# Patient Record
Sex: FEMALE | URBAN - METROPOLITAN AREA
[De-identification: names, ages, dates, MRNs, and addresses within clinical notes are randomized per-mention and may not be internally consistent; named-entity substitution may affect disease eponyms.]

---

## 2020-05-21 ENCOUNTER — APPOINTMENT (RX ONLY)
Dept: URBAN - METROPOLITAN AREA CLINIC 26 | Facility: CLINIC | Age: 70
Setting detail: DERMATOLOGY
End: 2020-05-21

## 2020-05-21 DIAGNOSIS — L259 CONTACT DERMATITIS AND OTHER ECZEMA, UNSPECIFIED CAUSE: ICD-10-CM

## 2020-05-21 PROBLEM — L30.9 DERMATITIS, UNSPECIFIED: Status: ACTIVE | Noted: 2020-05-21

## 2020-05-21 PROCEDURE — ? COUNSELING

## 2020-05-21 PROCEDURE — 99202 OFFICE O/P NEW SF 15 MIN: CPT

## 2020-05-21 PROCEDURE — ? DIAGNOSIS COMMENT

## 2020-05-21 PROCEDURE — ? PRESCRIPTION

## 2020-05-21 PROCEDURE — ? TREATMENT REGIMEN

## 2020-05-21 PROCEDURE — ? ADDITIONAL NOTES

## 2020-05-21 RX ORDER — FLUOCINONIDE 0.5 MG/G
OINTMENT TOPICAL BID
Qty: 1 | Refills: 3 | Status: ERX | COMMUNITY
Start: 2020-05-21

## 2020-05-21 RX ADMIN — FLUOCINONIDE: 0.5 OINTMENT TOPICAL at 00:00

## 2020-05-21 ASSESSMENT — LOCATION SIMPLE DESCRIPTION DERM
LOCATION SIMPLE: RIGHT PRETIBIAL REGION
LOCATION SIMPLE: LEFT PRETIBIAL REGION
LOCATION SIMPLE: RIGHT LOWER BACK

## 2020-05-21 ASSESSMENT — LOCATION ZONE DERM
LOCATION ZONE: TRUNK
LOCATION ZONE: LEG

## 2020-05-21 ASSESSMENT — LOCATION DETAILED DESCRIPTION DERM
LOCATION DETAILED: LEFT PROXIMAL PRETIBIAL REGION
LOCATION DETAILED: RIGHT PROXIMAL PRETIBIAL REGION
LOCATION DETAILED: RIGHT SUPERIOR MEDIAL MIDBACK

## 2020-05-21 NOTE — PROCEDURE: TREATMENT REGIMEN
Detail Level: Simple
Initiate Treatment: fluocinonide 0.05 % topical ointment BID Apply to affected areas BID for up to x 2 weeks per month

## 2020-05-21 NOTE — PROCEDURE: MIPS QUALITY
Quality 130: Documentation Of Current Medications In The Medical Record: Current Medications Documented
Quality 47: Advance Care Plan: Advance Care Planning discussed and documented in the medical record; patient did not wish or was not able to name a surrogate decision maker or provide an advance care plan.
Quality 431: Preventive Care And Screening: Unhealthy Alcohol Use - Screening: Patient screened for unhealthy alcohol use using a single question and scores less than 2 times per year
Quality 226: Preventive Care And Screening: Tobacco Use: Screening And Cessation Intervention: Patient screened for tobacco use and is an ex/non-smoker
Detail Level: Generalized

## 2020-09-03 ENCOUNTER — APPOINTMENT (RX ONLY)
Dept: URBAN - METROPOLITAN AREA CLINIC 26 | Facility: CLINIC | Age: 70
Setting detail: DERMATOLOGY
End: 2020-09-03

## 2020-09-03 DIAGNOSIS — L82.1 OTHER SEBORRHEIC KERATOSIS: ICD-10-CM

## 2020-09-03 DIAGNOSIS — L57.8 OTHER SKIN CHANGES DUE TO CHRONIC EXPOSURE TO NONIONIZING RADIATION: ICD-10-CM

## 2020-09-03 DIAGNOSIS — D22 MELANOCYTIC NEVI: ICD-10-CM

## 2020-09-03 PROBLEM — D22.9 MELANOCYTIC NEVI, UNSPECIFIED: Status: ACTIVE | Noted: 2020-09-03

## 2020-09-03 PROCEDURE — ? ADDITIONAL NOTES

## 2020-09-03 PROCEDURE — ? COUNSELING

## 2020-09-03 PROCEDURE — 99213 OFFICE O/P EST LOW 20 MIN: CPT

## 2020-09-03 PROCEDURE — ? FULL BODY SKIN EXAM

## 2020-09-03 PROCEDURE — ? PATIENT SPECIFIC COUNSELING

## 2022-01-05 ENCOUNTER — HOSPITAL ENCOUNTER (OUTPATIENT)
Dept: MRI IMAGING | Facility: HOSPITAL | Age: 72
Discharge: HOME | End: 2022-01-05
Payer: MEDICARE

## 2022-01-05 DIAGNOSIS — M77.41 METATARSALGIA, RIGHT FOOT: ICD-10-CM

## 2022-01-05 PROCEDURE — 73718 MRI LOWER EXTREMITY W/O DYE: CPT

## 2022-01-09 ENCOUNTER — NURSE TRIAGE (OUTPATIENT)
Dept: OTHER | Facility: OTHER | Age: 72
End: 2022-01-09

## 2022-01-09 DIAGNOSIS — U07.1 COVID: Primary | ICD-10-CM

## 2022-01-09 NOTE — TELEPHONE ENCOUNTER
Regarding: Symptomatic COVID headache   ----- Message from Karlos Andrade sent at 1/8/2022  9:42 PM EST -----  "i have symptoms of headache, chills, cough, congestion"

## 2022-01-09 NOTE — TELEPHONE ENCOUNTER
Reason for Disposition   [1] COVID-19 infection suspected by caller or triager AND [2] mild symptoms (cough, fever, or others) AND [3] has not gotten tested yet    Answer Assessment - Initial Assessment Questions  1  COVID-19 DIAGNOSIS: "Who made your COVID-19 diagnosis?" "Was it confirmed by a positive lab test?" If not diagnosed by a HCP, ask "Are there lots of cases (community spread) where you live?" Note: See public health department website, if unsure  N/A  2  COVID-19 EXPOSURE: "Was there any known exposure to COVID before the symptoms began?" [facility] Definition of close contact: within 6 feet (2 meters) for a total of 15 minutes or more over a 24-hour period  Not sure   3  ONSET: "When did the COVID-19 symptoms start?"      Thursday afternoon  4  WORST SYMPTOM: "What is your worst symptom?" (e g , cough, fever, shortness of breath, muscle aches)      cough  5  COUGH: "Do you have a cough?" If Yes, ask: "How bad is the cough?"        yes  6  FEVER: "Do you have a fever?" If Yes, ask: "What is your temperature, how was it measured, and when did it start?"      denies  7  RESPIRATORY STATUS: "Describe your breathing?" (e g , shortness of breath, wheezing, unable to speak)       WNL  8  BETTER-SAME-WORSE: "Are you getting better, staying the same or getting worse compared to yesterday?"  If getting worse, ask, "In what way?"      Same  9  HIGH RISK DISEASE: "Do you have any chronic medical problems?" (e g , asthma, heart or lung disease, weak immune system, obesity, etc )      no  10  VACCINE: "Have you gotten the COVID-19 vaccine?" If Yes ask: "Which one, how many shots, when did you get it?"        Yes J&J  11  PREGNANCY: "Is there any chance you are pregnant?" "When was your last menstrual period?"        N/A  12   OTHER SYMPTOMS: "Do you have any other symptoms?"  (e g , chills, fatigue, headache, loss of smell or taste, muscle pain, sore throat; new loss of smell or taste especially support the diagnosis of COVID-19)        HA, congestion, sore throat , chills    Protocols used: CORONAVIRUS (COVID-19) DIAGNOSED OR SUSPECTED-ADULT-AH

## 2022-01-10 PROCEDURE — U0003 INFECTIOUS AGENT DETECTION BY NUCLEIC ACID (DNA OR RNA); SEVERE ACUTE RESPIRATORY SYNDROME CORONAVIRUS 2 (SARS-COV-2) (CORONAVIRUS DISEASE [COVID-19]), AMPLIFIED PROBE TECHNIQUE, MAKING USE OF HIGH THROUGHPUT TECHNOLOGIES AS DESCRIBED BY CMS-2020-01-R: HCPCS | Performed by: INTERNAL MEDICINE

## 2022-01-10 PROCEDURE — U0005 INFEC AGEN DETEC AMPLI PROBE: HCPCS | Performed by: INTERNAL MEDICINE

## 2022-01-26 NOTE — PROGRESS NOTES
Assessment/Plan:  Problem List Items Addressed This Visit        Musculoskeletal and Integument    Osteoporosis     Management per Endo Dr Tadeo Dunlap in [place], [place]  Sees yearly  Next appt 10/22  On Reclast - last infusion 10/21, next infusion due 10/22  Last Dexa 2020  Relevant Orders    Vitamin D 25 hydroxy       Other    Other hyperlipidemia - Primary     Pending labs  Recommend lifestyle modifications  Relevant Orders    CBC and differential    Comprehensive metabolic panel    Lipid panel    TSH, 3rd generation with Free T4 reflex    LDL cholesterol, direct    Other insomnia     Stable on Trazodone 50mg 1/2 tab QHS PRN -usually uses nightly  Recommend lifestyle modifications  Relevant Medications    traZODone (DESYREL) 50 mg tablet    History of left breast cancer     In remission  Patient no longer following with Hematology-Oncology  Mammogram is up-to-date  Metatarsalgia of right foot     Management per Podiatry  Other Visit Diagnoses     Dermatitis        Relevant Orders    Ambulatory Referral to Dermatology    Continue Fluocinonide 0 05% BID PRN  Fragrance-free topicals advised  Chronic frontal sinusitis        Relevant Medications    doxycycline hyclate (VIBRA-TABS) 100 mg tablet    Advise Garth med sinus rinse kit, Mucinex, Claritin/Zyrtec/Allegra, Flonase  Avoid decongestants if you have high blood pressure  Suspect symptoms may be due AR          Encounter for immunization        Relevant Medications    Zoster Vac Recomb Adjuvanted (Shingrix) 50 MCG/0 5ML SUSR    tetanus-diphtheria-acellular pertussis (ADACEL) 5-2-15 5 LF-mcg/0 5 injection    Other Relevant Orders    PNEUMOCOCCAL CONJUGATE VACCINE 13-VALENT LESS THAN 5Y0  (Prevnar 13) (Completed)    Need for hepatitis C screening test        Relevant Orders    Hepatitis C antibody    IFG (impaired fasting glucose)        Relevant Orders    Hemoglobin A1C           Return in about 6 weeks (around 3/10/2022) for iAWV/ F/U HL, Insomnia, AR?, Labs  Future Appointments   Date Time Provider Carlie Gallegos   3/17/2022 12:20 PM Jackie Lynch DO [facility]        Subjective:     Gladys Leroy is a 80 y o  female who presents today as a new patient for her medical conditions  New Patient    Previous PCP:  Ms Kip Moncada, NP [address] [facility] [address] [place] [address] in [place], [place]  Reason for Transfer:  Patient moved  Last seen by previous PCP:  11/5/21  Last Labs:  10/1/21  Last Physical:  Unsure of last AWV  Medical Records Requested:  Yes - Colonscopy, Dexa  HPI:  Chief Complaint   Patient presents with    New Patient Visit     HAS RASH ON BACK THAT DOES NOT GO AWAY, HAS USED FLUOCINONIDE WITH NO IMPROVEMENT  POST NASAL DRIP THAT DOESNT GO AWAY - HAS HAD FOR A COUPLE MONTHS, HAS TAKEN MUCINEX WITH NO RELIEF DUE TO THAT ITS DIFFICULT TO SWALLOW    Medication Refill     NONE    Labs Only     NO LABS    HM     WILL BRING CRC AT NEXT VISIT, DXA HAD IT DONE      -- Above per clinical staff and reviewed  --      HPI      Today:      Rash - On Back - Symptoms x over 1 year, intermittently  Last seen by Derm 6/21 who Rx - Fluocinonide 0 05% BID PRN which helps the itching, but the rash never resolves  No pain  Seen by 6/21 by Juanita Kahn - unsure of Dx  Not other Rx  Post-nasal drip - Symptoms x couple month  Patient states Beti Fair helpful in the past as she feels she has a bacterial and not a virus  Mucinex unhelpful  Pushing fluids c oil of oregano unhelpful  Right Foot Metatarslagia - Management per [facility] Dr Tonya Jasmine  Next appt 2/22  Hyperlipidemia - No statin previously  Watching diet  No regular exercise  Walks occasionally  Has access to a gym  Left Breast Cancer - Dx 2014  No longer seeing Heme/Onc as care completed     S/p left mastectomy, tram flap procedure, fat transplants c necrosis, implants  No CTX or RTX  Patient on Tamoxifen for 2 months in 2014 as she could not tolerate side effects  Insomnia - On Trazodone 50mg 1/2 tab QHS PRN -usually uses nightly, which is helpful  She awakens once for nocturia then awakens, falls back asleep for minutes to hours   snores  Drinks 12 oz caffeine daily  No higher dose of Trazodone or other sleep meds previously  Last attended marriage counseling - 2012 - helpful  Good social supports  No SI/HI/AH/VH  PHQ-2/9 Depression Screening    Little interest or pleasure in doing things: 0 - not at all  Feeling down, depressed, or hopeless: 0 - not at all  Trouble falling or staying asleep, or sleeping too much: 0 - not at all  Feeling tired or having little energy: 0 - not at all  Poor appetite or overeatin - not at all  Feeling bad about yourself - or that you are a failure or have let yourself or your family down: 0 - not at all  Trouble concentrating on things, such as reading the newspaper or watching television: 0 - not at all  Moving or speaking so slowly that other people could have noticed  Or the opposite - being so fidgety or restless that you have been moving around a lot more than usual: 0 - not at all  Thoughts that you would be better off dead, or of hurting yourself in some way: 0 - not at all  PHQ-2 Score: 0  PHQ-2 Interpretation: Negative depression screen  PHQ-9 Score: 0   PHQ-9 Interpretation: No or Minimal depression        KIMMY-7 Flowsheet Screening      Most Recent Value   Over the last 2 weeks, how often have you been bothered by any of the following problems?     Feeling nervous, anxious, or on edge 1   Not being able to stop or control worrying 0   Worrying too much about different things 0   Trouble relaxing 0   Being so restless that it is hard to sit still 0   Becoming easily annoyed or irritable 0   Feeling afraid as if something awful might happen 0   KIMMY-7 Total Score 1        MDQ:  0, Asynchronous, No Problem    Osteoporosis - Management per Endo Dr Abel Avelar in [place], [place]  Sees yearly  Next appt 10/22  On Reclast - last infusion 10/21, next infusion due 10/22  Last Dexa 2020  Reviewed:  Labs 10/1/21    No Gyn  Last colonoscopy 12/21 - normal per patient  The following portions of the patient's history were reviewed and updated as appropriate: allergies, current medications, past family history, past medical history, past social history, past surgical history and problem list       Review of Systems   Constitutional: Negative for appetite change, chills, diaphoresis, fatigue and fever  HENT: Positive for postnasal drip  Negative for congestion, rhinorrhea and sore throat  Eyes: Negative for pain  Respiratory: Negative for cough, chest tightness and shortness of breath  Cardiovascular: Negative for chest pain  Gastrointestinal: Negative for abdominal pain, blood in stool, diarrhea, nausea and vomiting  Genitourinary: Negative for dysuria  Skin: Positive for rash  Current Outpatient Medications   Medication Sig Dispense Refill    ascorbic acid (VITAMIN C) 1000 MG tablet Take 1,000 mg by mouth daily      Biotin 300 MCG TABS Take 1 tablet by mouth in the morning        Calcium Carb-Cholecalciferol 600-800 MG-UNIT TABS Take 1 tablet by mouth 2 (two) times a day        fluocinonide (LIDEX) 0 05 % cream Apply 1 application topically 2 (two) times a day as needed for rash Rx per Derm      Multiple Vitamin (Tab-A-Selene) TABS Take 1 tablet by mouth daily      Omega-3 Fatty Acids (fish oil) 1,000 mg Take 2,000 mg by mouth daily      traZODone (DESYREL) 50 mg tablet Take 0 5-1 tablets (25-50 mg total) by mouth daily at bedtime as needed for sleep 90 tablet 0    Turmeric 500 MG CAPS Take 1 capsule by mouth in the morning        Zoledronic Acid (RECLAST IV) 1 dose IV yearly         doxycycline hyclate (VIBRA-TABS) 100 mg tablet Take 1 tablet (100 mg total) by mouth 2 (two) times a day for 10 days 20 tablet 0    tetanus-diphtheria-acellular pertussis (ADACEL) 5-2-15 5 LF-mcg/0 5 injection Inject 0 5 mL into a muscle once for 1 dose 0 5 mL 0    Zoster Vac Recomb Adjuvanted (Shingrix) 50 MCG/0 5ML SUSR 0 5mL IM for one dose, followed by 0 5mL IM 2-6 months after first dose 1 each 1     No current facility-administered medications for this visit  Objective:  /64   Pulse 73   Temp 97 5 °F (36 4 °C)   Resp 16   Ht 5' 4 76" (1 645 m)   Wt 66 2 kg (146 lb)   SpO2 99%   BMI 24 47 kg/m²    Wt Readings from Last 3 Encounters:   01/27/22 66 2 kg (146 lb)      BP Readings from Last 3 Encounters:   01/27/22 110/64          Physical Exam  Vitals and nursing note reviewed  Constitutional:       Appearance: Normal appearance  She is well-developed and normal weight  HENT:      Head: Normocephalic and atraumatic  Right Ear: Tympanic membrane, ear canal and external ear normal       Left Ear: Tympanic membrane, ear canal and external ear normal       Nose: Congestion present  Right Sinus: Frontal sinus tenderness present  No maxillary sinus tenderness  Left Sinus: Frontal sinus tenderness present  No maxillary sinus tenderness  Mouth/Throat:      Mouth: Mucous membranes are moist       Pharynx: Uvula midline  Tonsils: No tonsillar exudate  Eyes:      Extraocular Movements: Extraocular movements intact  Conjunctiva/sclera: Conjunctivae normal       Pupils: Pupils are equal, round, and reactive to light  Neck:      Vascular: No carotid bruit  Cardiovascular:      Rate and Rhythm: Normal rate and regular rhythm  Pulses: Normal pulses  Heart sounds: Normal heart sounds  Pulmonary:      Effort: Pulmonary effort is normal       Breath sounds: Normal breath sounds  Abdominal:      General: Bowel sounds are normal  There is no distension  Palpations: Abdomen is soft  There is no mass  Tenderness:  There is no abdominal tenderness  There is no guarding or rebound  Musculoskeletal:         General: No swelling or tenderness  Cervical back: Neck supple  Right lower leg: No edema  Left lower leg: No edema  Lymphadenopathy:      Cervical: No cervical adenopathy  Skin:     Findings: No rash  Comments: Cental back T12-L2 c 6cm x 6cm Lenore tree-shaped blanching, nontender, dry, erythematous patch   Neurological:      General: No focal deficit present  Mental Status: She is alert and oriented to person, place, and time  Psychiatric:         Mood and Affect: Mood normal          Behavior: Behavior normal          Thought Content: Thought content normal          Judgment: Judgment normal          Lab Results:      No results found for: WBC, HGB, HCT, PLT, CHOL, TRIG, HDL, LDLDIRECT, ALT, AST, NA, K, CL, CREATININE, BUN, CO2, TSH, PSA, INR, GLUF, HGBA1C, MICROALBUR  No results found for: URICACID  Invalid input(s): BASENAME Vitamin D    MRI foot/forefoot toes right wo contrast    Result Date: 1/5/2022  Narrative: MRI RIGHT FOOT INDICATION:   M77 [id number]: Metatarsalgia, right foot  COMPARISON: None  TECHNIQUE:  MR sequences were obtained of the right foot including the following:  Localizer, sagittal T1/STIR, coronal T1/T2 fat/ sat/STIR, axial T2 fat sat/STIR/PD  Images were acquired on a 1 5 Faiza unit  Gadolinium was not used FINDINGS: SUBCUTANEOUS TISSUES: Normal BONES:  Normal signal intensity  FIRST MTP JOINT:  Mild cartilage thinning and marginal osteophytes compatible with mild osteoarthrosis  SESAMOID BONES:  Subchondral cystic changes noted at the volar base of the distal 1st metatarsal at the level of the sesamoids compatible with osteoarthrosis  The intersesamoid and sesamoid phalangeal ligaments appear intact  OTHER ARTICULAR SURFACE:  As mentioned above in the 1st MTP joint  PLANTAR FASCIA:  Intact  LISFRANC LIGAMENT:  Intact  FOREFOOT TENDONS: Intact   INTERMETATARSAL REGIONS: No bursitis or Seay's neuroma  MUSCULATURE: There is mild edema in the adductor hallucis muscle compatible with grade 1 strain  Impression: Osteoarthrosis of the 1st MTP joint and 1st metatarsal-sesamoid articulation  Grade 1 strain of the abductor hallucis muscle  Workstation performed: [id number]        POCT Labs        Depression Screening and Follow-up Plan: Patient was screened for depression during today's encounter  They screened negative with a PHQ-2 score of 0

## 2022-01-27 ENCOUNTER — OFFICE VISIT (OUTPATIENT)
Dept: FAMILY MEDICINE CLINIC | Facility: CLINIC | Age: 72
End: 2022-01-27
Payer: MEDICARE

## 2022-01-27 VITALS
RESPIRATION RATE: 16 BRPM | DIASTOLIC BLOOD PRESSURE: 64 MMHG | OXYGEN SATURATION: 99 % | SYSTOLIC BLOOD PRESSURE: 110 MMHG | WEIGHT: 146 LBS | BODY MASS INDEX: 24.32 KG/M2 | HEART RATE: 73 BPM | HEIGHT: 65 IN | TEMPERATURE: 97.5 F

## 2022-01-27 DIAGNOSIS — M81.0 OSTEOPOROSIS, UNSPECIFIED OSTEOPOROSIS TYPE, UNSPECIFIED PATHOLOGICAL FRACTURE PRESENCE: ICD-10-CM

## 2022-01-27 DIAGNOSIS — M77.41 METATARSALGIA OF RIGHT FOOT: ICD-10-CM

## 2022-01-27 DIAGNOSIS — Z85.3 HISTORY OF LEFT BREAST CANCER: ICD-10-CM

## 2022-01-27 DIAGNOSIS — E78.49 OTHER HYPERLIPIDEMIA: Primary | ICD-10-CM

## 2022-01-27 DIAGNOSIS — Z11.59 NEED FOR HEPATITIS C SCREENING TEST: ICD-10-CM

## 2022-01-27 DIAGNOSIS — Z23 ENCOUNTER FOR IMMUNIZATION: ICD-10-CM

## 2022-01-27 DIAGNOSIS — R73.01 IFG (IMPAIRED FASTING GLUCOSE): ICD-10-CM

## 2022-01-27 DIAGNOSIS — L30.9 DERMATITIS: ICD-10-CM

## 2022-01-27 DIAGNOSIS — G47.09 OTHER INSOMNIA: ICD-10-CM

## 2022-01-27 DIAGNOSIS — J32.1 CHRONIC FRONTAL SINUSITIS: ICD-10-CM

## 2022-01-27 PROCEDURE — G0009 ADMIN PNEUMOCOCCAL VACCINE: HCPCS

## 2022-01-27 PROCEDURE — 90670 PCV13 VACCINE IM: CPT

## 2022-01-27 PROCEDURE — 99205 OFFICE O/P NEW HI 60 MIN: CPT | Performed by: FAMILY MEDICINE

## 2022-01-27 RX ORDER — CHLORAL HYDRATE 500 MG
2000 CAPSULE ORAL DAILY
COMMUNITY

## 2022-01-27 RX ORDER — ZOSTER VACCINE RECOMBINANT, ADJUVANTED 50 MCG/0.5
KIT INTRAMUSCULAR
Qty: 1 EACH | Refills: 1 | Status: SHIPPED | OUTPATIENT
Start: 2022-01-27

## 2022-01-27 RX ORDER — LANOLIN ALCOHOL/MO/W.PET/CERES
1 CREAM (GRAM) TOPICAL DAILY
COMMUNITY

## 2022-01-27 RX ORDER — TRAZODONE HYDROCHLORIDE 50 MG/1
25-50 TABLET ORAL
Qty: 90 TABLET | Refills: 0 | Status: SHIPPED | OUTPATIENT
Start: 2022-01-27 | End: 2022-03-17 | Stop reason: SDUPTHER

## 2022-01-27 RX ORDER — DOXYCYCLINE HYCLATE 100 MG
100 TABLET ORAL 2 TIMES DAILY
Qty: 20 TABLET | Refills: 0 | Status: SHIPPED | OUTPATIENT
Start: 2022-01-27 | End: 2022-02-06

## 2022-01-27 RX ORDER — MULTIVITAMIN WITH FOLIC ACID 400 MCG
1 TABLET ORAL DAILY
COMMUNITY

## 2022-01-27 RX ORDER — TRAZODONE HYDROCHLORIDE 50 MG/1
25-50 TABLET ORAL
COMMUNITY
Start: 2021-11-22 | End: 2022-01-27 | Stop reason: SDUPTHER

## 2022-01-27 RX ORDER — PROPRANOLOL/HYDROCHLOROTHIAZID 40 MG-25MG
1 TABLET ORAL DAILY
COMMUNITY

## 2022-01-27 NOTE — ASSESSMENT & PLAN NOTE
Management per Endo Dr Lanie Henriquez in [place], [place]  Sees yearly  Next appt 10/22  On Reclast - last infusion 10/21, next infusion due 10/22  Last Dexa 2020

## 2022-01-27 NOTE — PATIENT INSTRUCTIONS
Advise Verneice Bath med sinus rinse kit, Mucinex, Claritin/Zyrtec/Allegra, Flonase  Avoid decongestants if you have high blood pressure

## 2022-03-10 ENCOUNTER — LAB (OUTPATIENT)
Dept: LAB | Facility: CLINIC | Age: 72
End: 2022-03-10
Payer: MEDICARE

## 2022-03-10 ENCOUNTER — RA CDI HCC (OUTPATIENT)
Dept: OTHER | Facility: HOSPITAL | Age: 72
End: 2022-03-10

## 2022-03-10 DIAGNOSIS — Z11.59 NEED FOR HEPATITIS C SCREENING TEST: ICD-10-CM

## 2022-03-10 DIAGNOSIS — R73.01 IFG (IMPAIRED FASTING GLUCOSE): ICD-10-CM

## 2022-03-10 DIAGNOSIS — M81.0 OSTEOPOROSIS, UNSPECIFIED OSTEOPOROSIS TYPE, UNSPECIFIED PATHOLOGICAL FRACTURE PRESENCE: ICD-10-CM

## 2022-03-10 DIAGNOSIS — E78.49 OTHER HYPERLIPIDEMIA: ICD-10-CM

## 2022-03-10 LAB
25(OH)D3 SERPL-MCNC: 37.4 NG/ML (ref 30–100)
ALBUMIN SERPL BCP-MCNC: 4.2 G/DL (ref 3.5–5)
ALP SERPL-CCNC: 49 U/L (ref 46–116)
ALT SERPL W P-5'-P-CCNC: 26 U/L (ref 12–78)
ANION GAP SERPL CALCULATED.3IONS-SCNC: 8 MMOL/L (ref 4–13)
AST SERPL W P-5'-P-CCNC: 18 U/L (ref 5–45)
BASOPHILS # BLD AUTO: 0.06 THOUSANDS/ΜL (ref 0–0.1)
BASOPHILS NFR BLD AUTO: 1 % (ref 0–1)
BILIRUB SERPL-MCNC: 0.46 MG/DL (ref 0.2–1)
BUN SERPL-MCNC: 17 MG/DL (ref 5–25)
CALCIUM SERPL-MCNC: 9.1 MG/DL (ref 8.3–10.1)
CHLORIDE SERPL-SCNC: 104 MMOL/L (ref 100–108)
CHOLEST SERPL-MCNC: 248 MG/DL
CO2 SERPL-SCNC: 27 MMOL/L (ref 21–32)
CREAT SERPL-MCNC: 0.81 MG/DL (ref 0.6–1.3)
EOSINOPHIL # BLD AUTO: 0.42 THOUSAND/ΜL (ref 0–0.61)
EOSINOPHIL NFR BLD AUTO: 5 % (ref 0–6)
ERYTHROCYTE [DISTWIDTH] IN BLOOD BY AUTOMATED COUNT: 13.2 % (ref 11.6–15.1)
EST. AVERAGE GLUCOSE BLD GHB EST-MCNC: 117 MG/DL
GFR SERPL CREATININE-BSD FRML MDRD: 73 ML/MIN/1.73SQ M
GLUCOSE P FAST SERPL-MCNC: 106 MG/DL (ref 65–99)
HBA1C MFR BLD: 5.7 %
HCT VFR BLD AUTO: 41.5 % (ref 34.8–46.1)
HCV AB SER QL: NORMAL
HDLC SERPL-MCNC: 83 MG/DL
HGB BLD-MCNC: 14.1 G/DL (ref 11.5–15.4)
IMM GRANULOCYTES # BLD AUTO: 0.02 THOUSAND/UL (ref 0–0.2)
IMM GRANULOCYTES NFR BLD AUTO: 0 % (ref 0–2)
LDLC SERPL CALC-MCNC: 142 MG/DL (ref 0–100)
LDLC SERPL DIRECT ASSAY-MCNC: 153 MG/DL (ref 0–100)
LYMPHOCYTES # BLD AUTO: 3.25 THOUSANDS/ΜL (ref 0.6–4.47)
LYMPHOCYTES NFR BLD AUTO: 41 % (ref 14–44)
MCH RBC QN AUTO: 30.2 PG (ref 26.8–34.3)
MCHC RBC AUTO-ENTMCNC: 34 G/DL (ref 31.4–37.4)
MCV RBC AUTO: 89 FL (ref 82–98)
MONOCYTES # BLD AUTO: 0.48 THOUSAND/ΜL (ref 0.17–1.22)
MONOCYTES NFR BLD AUTO: 6 % (ref 4–12)
NEUTROPHILS # BLD AUTO: 3.66 THOUSANDS/ΜL (ref 1.85–7.62)
NEUTS SEG NFR BLD AUTO: 47 % (ref 43–75)
NONHDLC SERPL-MCNC: 165 MG/DL
NRBC BLD AUTO-RTO: 0 /100 WBCS
PLATELET # BLD AUTO: 258 THOUSANDS/UL (ref 149–390)
PMV BLD AUTO: 9.7 FL (ref 8.9–12.7)
POTASSIUM SERPL-SCNC: 3.6 MMOL/L (ref 3.5–5.3)
PROT SERPL-MCNC: 7.9 G/DL (ref 6.4–8.2)
RBC # BLD AUTO: 4.67 MILLION/UL (ref 3.81–5.12)
SODIUM SERPL-SCNC: 139 MMOL/L (ref 136–145)
T4 FREE SERPL-MCNC: 1.07 NG/DL (ref 0.76–1.46)
TRIGL SERPL-MCNC: 116 MG/DL
TSH SERPL DL<=0.05 MIU/L-ACNC: 6.16 UIU/ML (ref 0.36–3.74)
WBC # BLD AUTO: 7.89 THOUSAND/UL (ref 4.31–10.16)

## 2022-03-10 PROCEDURE — 84443 ASSAY THYROID STIM HORMONE: CPT

## 2022-03-10 PROCEDURE — 82306 VITAMIN D 25 HYDROXY: CPT

## 2022-03-10 PROCEDURE — 85025 COMPLETE CBC W/AUTO DIFF WBC: CPT

## 2022-03-10 PROCEDURE — 80061 LIPID PANEL: CPT

## 2022-03-10 PROCEDURE — 36415 COLL VENOUS BLD VENIPUNCTURE: CPT

## 2022-03-10 PROCEDURE — 84439 ASSAY OF FREE THYROXINE: CPT

## 2022-03-10 PROCEDURE — 86803 HEPATITIS C AB TEST: CPT

## 2022-03-10 PROCEDURE — 83036 HEMOGLOBIN GLYCOSYLATED A1C: CPT

## 2022-03-10 PROCEDURE — 83721 ASSAY OF BLOOD LIPOPROTEIN: CPT

## 2022-03-10 PROCEDURE — 80053 COMPREHEN METABOLIC PANEL: CPT

## 2022-03-10 NOTE — PROGRESS NOTES
[facility] 75  coding opportunities       Chart reviewed, no opportunity found: CHART REVIEWED, NO OPPORTUNITY FOUND                        Patients insurance company: [employer] (Medicare Advantage and Commercial)

## 2022-03-10 NOTE — RESULT ENCOUNTER NOTE
Unstable labs - will review with patient at upcoming appointment  Hyperlipidemia - Recommend lifestyle modifications  The 10-year ASCVD risk score (Fadia Calix et al , 2013) is: 7%    Values used to calculate the score:      Age: 80 years      Sex: Female      Is Non- : No      Diabetic: No      Tobacco smoker: No      Systolic Blood Pressure: 216 mmHg      Is BP treated: No      HDL Cholesterol: 83 mg/dL      Total Cholesterol: 248 mg/dL    HypOthyroidism - Subclinical?  Repeat thyroid labs and Abs in 6 weeks

## 2022-03-17 ENCOUNTER — OFFICE VISIT (OUTPATIENT)
Dept: FAMILY MEDICINE CLINIC | Facility: CLINIC | Age: 72
End: 2022-03-17
Payer: MEDICARE

## 2022-03-17 VITALS
SYSTOLIC BLOOD PRESSURE: 118 MMHG | RESPIRATION RATE: 16 BRPM | HEIGHT: 65 IN | TEMPERATURE: 97.7 F | HEART RATE: 83 BPM | BODY MASS INDEX: 24.29 KG/M2 | OXYGEN SATURATION: 99 % | WEIGHT: 145.8 LBS | DIASTOLIC BLOOD PRESSURE: 66 MMHG

## 2022-03-17 DIAGNOSIS — E78.49 OTHER HYPERLIPIDEMIA: ICD-10-CM

## 2022-03-17 DIAGNOSIS — Z23 ENCOUNTER FOR IMMUNIZATION: ICD-10-CM

## 2022-03-17 DIAGNOSIS — E03.8 OTHER SPECIFIED HYPOTHYROIDISM: ICD-10-CM

## 2022-03-17 DIAGNOSIS — Z00.00 MEDICARE ANNUAL WELLNESS VISIT, INITIAL: Primary | ICD-10-CM

## 2022-03-17 DIAGNOSIS — R73.01 IFG (IMPAIRED FASTING GLUCOSE): ICD-10-CM

## 2022-03-17 DIAGNOSIS — Z12.31 ENCOUNTER FOR SCREENING MAMMOGRAM FOR BREAST CANCER: ICD-10-CM

## 2022-03-17 DIAGNOSIS — F43.23 ADJUSTMENT DISORDER WITH MIXED ANXIETY AND DEPRESSED MOOD: ICD-10-CM

## 2022-03-17 DIAGNOSIS — M77.41 METATARSALGIA OF RIGHT FOOT: ICD-10-CM

## 2022-03-17 DIAGNOSIS — R13.10 DYSPHAGIA, UNSPECIFIED TYPE: ICD-10-CM

## 2022-03-17 DIAGNOSIS — G47.09 OTHER INSOMNIA: ICD-10-CM

## 2022-03-17 DIAGNOSIS — M81.0 OSTEOPOROSIS, UNSPECIFIED OSTEOPOROSIS TYPE, UNSPECIFIED PATHOLOGICAL FRACTURE PRESENCE: ICD-10-CM

## 2022-03-17 DIAGNOSIS — Z85.3 HISTORY OF LEFT BREAST CANCER: ICD-10-CM

## 2022-03-17 DIAGNOSIS — Z23 NEED FOR VACCINATION: ICD-10-CM

## 2022-03-17 PROCEDURE — 99214 OFFICE O/P EST MOD 30 MIN: CPT | Performed by: FAMILY MEDICINE

## 2022-03-17 PROCEDURE — 1123F ACP DISCUSS/DSCN MKR DOCD: CPT | Performed by: FAMILY MEDICINE

## 2022-03-17 PROCEDURE — G0438 PPPS, INITIAL VISIT: HCPCS | Performed by: FAMILY MEDICINE

## 2022-03-17 RX ORDER — PANTOPRAZOLE SODIUM 20 MG/1
20 TABLET, DELAYED RELEASE ORAL
Qty: 30 TABLET | Refills: 1 | Status: SHIPPED | OUTPATIENT
Start: 2022-03-17 | End: 2022-07-19

## 2022-03-17 RX ORDER — TRAZODONE HYDROCHLORIDE 50 MG/1
25-50 TABLET ORAL
Qty: 90 TABLET | Refills: 2 | Status: SHIPPED | OUTPATIENT
Start: 2022-03-17

## 2022-03-17 RX ORDER — DIMENHYDRINATE 50 MG
100 TABLET ORAL DAILY
COMMUNITY

## 2022-03-17 RX ORDER — LEVOTHYROXINE SODIUM 0.03 MG/1
25 TABLET ORAL
Qty: 30 TABLET | Refills: 1 | Status: SHIPPED | OUTPATIENT
Start: 2022-03-17 | End: 2022-04-28

## 2022-03-17 NOTE — PROGRESS NOTES
Assessment and Plan:     Problem List Items Addressed This Visit        Endocrine    IFG (impaired fasting glucose)     New  Patient is considering starting metformin XR 1500 mg daily  She will call for Rx and Rx for CMP 1 month after starting Rx if interested  Advise lifestyle modifications  Relevant Orders    Ambulatory Referral to Diabetic Education    Comprehensive metabolic panel    Hemoglobin A1C    Other specified hypothyroidism     New  Start Synthroid 25 mcg daily  Pending labs  Relevant Medications    levothyroxine (Synthroid) 25 mcg tablet    Other Relevant Orders    TSH, 3rd generation with Free T4 reflex    Thyroid Antibodies Panel    Thyroglobulin Panel    Anti-microsomal antibody       Musculoskeletal and Integument    Osteoporosis     Management per Endo Dr Urbano Situ in [place], [place]  Sees yearly  Next appt 10/22  On Reclast - last infusion 10/21, next infusion due 10/22  Last Dexa 2020  Relevant Orders    DXA bone density spine hip and pelvis       Other    Other hyperlipidemia     Unstable  Patient declines starting statin today despite increased ASCVD risk  Would Rx Lipitor 20mg QHS  Recommend lifestyle modifications  The 10-year ASCVD risk score (Shakila Painter et al , 2013) is: 8%    Values used to calculate the score:      Age: 80 years      Sex: Female      Is Non- : No      Diabetic: No      Tobacco smoker: No      Systolic Blood Pressure: 926 mmHg      Is BP treated: No      HDL Cholesterol: 83 mg/dL      Total Cholesterol: 248 mg/dL           Relevant Orders    Ambulatory Referral to Diabetic Education    Comprehensive metabolic panel    TSH, 3rd generation with Free T4 reflex    LDL cholesterol, direct    Other insomnia     Stable on Trazodone 50mg 1/2 tab QHS PRN - usually uses nightly  Recommend lifestyle modifications           Relevant Medications    traZODone (DESYREL) 50 mg tablet    History of left breast cancer     In remission  Patient no longer following with Hematology-Oncology  Mammogram is up-to-date  Relevant Orders    Mammo screening bilateral w 3d & cad    Metatarsalgia of right foot     Management per Podiatry  F/U PRN  Other Visit Diagnoses     Medicare annual wellness visit, initial    -  Primary    Adjustment disorder with mixed anxiety and depressed mood        Relevant Medications    traZODone (DESYREL) 50 mg tablet    Encounter for screening mammogram for breast cancer        Relevant Orders    Mammo screening bilateral w 3d & cad    Encounter for immunization        Relevant Medications    tetanus-diphtheria-acellular pertussis (ADACEL) 5-2-15 5 LF-mcg/0 5 injection    Dysphagia, unspecified type        Relevant Medications    pantoprazole (PROTONIX) 20 mg tablet    Other Relevant Orders    Ambulatory Referral to Otolaryngology    Magnesium    Need for vaccination        Relevant Medications    tetanus-diphtheria-acellular pertussis (ADACEL) 5-2-15 5 LF-mcg/0 5 injection           Preventive health issues were discussed with patient, and age appropriate screening tests were ordered as noted in patient's After Visit Summary  Personalized health advice and appropriate referrals for health education or preventive services given if needed, as noted in patient's After Visit Summary       History of Present Illness:     Patient presents for Medicare Annual Wellness visit    Patient Care Team:  Ariadne Palacios DO as PCP - General (Family Medicine)     Problem List:     Patient Active Problem List   Diagnosis    Other hyperlipidemia    History of left breast cancer    Other insomnia    Osteoporosis    Metatarsalgia of right foot    IFG (impaired fasting glucose)    Other specified hypothyroidism      Past Medical and Surgical History:     Past Medical History:   Diagnosis Date    COVID-19 01/10/2022    History of left breast cancer 05/2014    S/p left mastectomy, trans flap procedure, fat transplants c necrosis, implants  No CTX or RTX   IFG (impaired fasting glucose)     Osteoporosis     Other hyperlipidemia     Other insomnia     Other specified hypothyroidism 3/17/2022     Past Surgical History:   Procedure Laterality Date    APPENDECTOMY  10/1959    BREAST IMPLANT Bilateral 2016    KNEE SURGERY Right 03/2018    Tibial Plateau Fracture - Plate and 5 Screws in place    MASTECTOMY Left 06/2014    Left Breast Cancer    RECONSTRUCTION BREAST W/ TRAM FLAP Left 06/2014    With fat graft x 4 due to necrosis    SHOULDER SURGERY Right 11/2020    [employer] - Calcification Removal - [address]  03/1978      Family History:     Family History   Problem Relation Age of Onset    Dementia Mother [de-identified]        Alzheimer    Alzheimer's disease Mother [de-identified]    Brain cancer Father 61        Type Unknown    Dementia Sister 79        Alzheimers    Alzheimer's disease Sister 79    Asthma Sister     No Known Problems Brother     No Known Problems Daughter     No Known Problems Daughter     Asthma Maternal Grandmother     Stroke Maternal Grandfather     Hearing loss Maternal Grandfather     Diabetes Paternal Grandmother     Heart attack Paternal Grandfather       Social History:     Social History     Socioeconomic History    Marital status: /Civil Union     Spouse name: None    Number of children: 2    Years of education: None    Highest education level: None   Occupational History    Occupation: Retired -    Tobacco Use    Smoking status: Never Smoker    Smokeless tobacco: Never Used   Vaping Use    Vaping Use: Never used   Substance and Sexual Activity    Alcohol use:  Yes     Alcohol/week: 5 0 standard drinks     Types: 5 Glasses of wine per week    Drug use: Never    Sexual activity: Yes     Partners: Male     Birth control/protection: Post-menopausal, Female Sterilization     Comment: Tubal Ligation   Other Topics Concern    None   Social History Narrative        Lives with     2 Children - 2 Daughters    Retired -      Social Determinants of Health     Financial Resource Strain: Not on [employer] Insecurity: Not on file   Transportation Needs: Not on file   Physical Activity: Not on file   Stress: Not on file   Social Connections: Not on file   Intimate Partner Violence: Not on file   Housing Stability: Not on file      Medications and Allergies:     Current Outpatient Medications   Medication Sig Dispense Refill    ascorbic acid (VITAMIN C) 1000 MG tablet Take 1,000 mg by mouth daily      Biotin 300 MCG TABS Take 1 tablet by mouth in the morning        Calcium Carb-Cholecalciferol 600-800 MG-UNIT TABS Take 1 tablet by mouth 2 (two) times a day        coenzyme Q-10 100 MG capsule Take 100 mg by mouth daily      Multiple Vitamin (Tab-A-Selene) TABS Take 1 tablet by mouth daily      Omega-3 Fatty Acids (fish oil) 1,000 mg Take 2,000 mg by mouth daily      traZODone (DESYREL) 50 mg tablet Take 0 5-1 tablets (25-50 mg total) by mouth daily at bedtime as needed for sleep 90 tablet 2    Turmeric 500 MG CAPS Take 1 capsule by mouth in the morning        Zoledronic Acid (RECLAST IV) 1 dose IV yearly   fluocinonide (LIDEX) 0 05 % cream Apply 1 application topically 2 (two) times a day as needed for rash Rx per Derm (Patient not taking: Reported on 3/17/2022 )      levothyroxine (Synthroid) 25 mcg tablet Take 1 tablet (25 mcg total) by mouth daily in the early morning 1 pill by mouth once daily on an empty stomach, 30 minutes prior to eating food or drink   30 tablet 1    pantoprazole (PROTONIX) 20 mg tablet Take 1 tablet (20 mg total) by mouth daily before breakfast 30 tablet 1    tetanus-diphtheria-acellular pertussis (ADACEL) 5-2-15 5 LF-mcg/0 5 injection Inject 0 5 mL into a muscle once for 1 dose 0 5 mL 0    Zoster Vac Recomb Adjuvanted (Shingrix) 50 MCG/0 5ML SUSR 0 5mL IM for one dose, followed by 0 5mL IM 2-6 months after first dose (Patient not taking: Reported on 3/17/2022 ) 1 each 1     No current facility-administered medications for this visit  Allergies   Allergen Reactions    Prochlorperazine Swelling, Seizures and Tongue Swelling     As a child    Penicillins Rash      Immunizations:     Immunization History   Administered Date(s) Administered    COVID-19 J&J ([place]) vaccine 0 5 mL 05/19/2021    INFLUENZA 09/03/2020    Influenza Injectable, MDCK, Preservative Free, Quadrivalent 10/03/2018    Influenza Injectable, MDCK, Preservative Free, Quadrivalent, 0 5 mL 03/05/2020    Influenza Quadrivalent Preservative Free 3 years and older IM 10/11/2017    Influenza, seasonal, injectable 01/01/2017, 09/21/2020, 10/13/2020, 11/17/2020, 12/29/2020    Pneumococcal Conjugate 13-Valent 01/27/2022    Pneumococcal Polysaccharide PPV23 10/03/2018, 10/13/2020    Tdap 01/01/2011    Zoster 01/01/2015      Health Maintenance:         Topic Date Due    Colorectal Cancer Screening  Never done    Breast Cancer Screening: Mammogram  08/24/2022    Hepatitis C Screening  Completed         Topic Date Due    DTaP,Tdap,and Td Vaccines (2 - Td or Tdap) 01/01/2021      Medicare Health Risk Assessment:     /66   Pulse 83   Temp 97 7 °F (36 5 °C)   Resp 16   Ht 5' 5 08" (1 653 m)   Wt 66 1 kg (145 lb 12 8 oz)   SpO2 99%   BMI 24 20 kg/m²      Ramón Schilling is here for her Initial Wellness visit  Health Risk Assessment:   Patient rates overall health as very good  Patient feels that their physical health rating is slightly worse  Patient is very satisfied with their life  Eyesight was rated as slightly worse  Hearing was rated as same  Patient feels that their emotional and mental health rating is same  Patients states they are sometimes angry  Patient states they are sometimes unusually tired/fatigued  Pain experienced in the last 7 days has been none   Patient states that she has experienced no weight loss or gain in last 6 months  Depression Screening:   PHQ-2 Score: 0      Fall Risk Screening: In the past year, patient has experienced: no history of falling in past year      Urinary Incontinence Screening:   Patient has not leaked urine accidently in the last six months  Home Safety:  Patient does not have trouble with stairs inside or outside of their home  Patient has working smoke alarms and has working carbon monoxide detector  Home safety hazards include: none  Nutrition:   Current diet is Regular, Low Carb and Limited junk food  Medications:   Patient is currently taking over-the-counter supplements  OTC medications include: see chart  Patient is able to manage medications  Activities of Daily Living (ADLs)/Instrumental Activities of Daily Living (IADLs):   Walk and transfer into and out of bed and chair?: Yes  Dress and groom yourself?: Yes    Bathe or shower yourself?: Yes    Feed yourself? Yes  Do your laundry/housekeeping?: Yes  Manage your money, pay your bills and track your expenses?: Yes  Make your own meals?: Yes    Do your own shopping?: Yes    Previous Hospitalizations:   Any hospitalizations or ED visits within the last 12 months?: No      Advance Care Planning:   Living will: No    Durable POA for healthcare: No    Advanced directive: No    Advanced directive counseling given: Yes    Five wishes given: Yes    Patient declined ACP directive: No      Comments: Living Will, POLST, and Five Wishes given today        Cognitive Screening:   Provider or family/friend/caregiver concerned regarding cognition?: No    PREVENTIVE SCREENINGS      Cardiovascular Screening:    General: Screening Not Indicated and History Lipid Disorder      Diabetes Screening:     General: Screening Current      Colorectal Cancer Screening:     General: Screening Current      Breast Cancer Screening:     General: Screening Current      Cervical Cancer Screening:    General: Screening Not Indicated      Osteoporosis Screening:    General: Screening Not Indicated and History Osteoporosis      Abdominal Aortic Aneurysm (AAA) Screening:        General: Screening Not Indicated      Lung Cancer Screening:     General: Screening Not Indicated      Hepatitis C Screening:    General: Screening Current    Screening, Brief Intervention, and Referral to Treatment (SBIRT)    Screening  Typical number of drinks in a day: 0  Typical number of drinks in a week: 5  Interpretation: Low risk drinking behavior  AUDIT-C Screenin) How often did you have a drink containing alcohol in the past year? 4 or more times a week  2) How many drinks did you have on a typical day when you were drinking in the past year? 1 to 2  3) How often did you have 6 or more drinks on one occasion in the past year? never    AUDIT-C Score: 4  Interpretation: Score 3-12 (female): POSITIVE screen for alcohol misuse    AUDIT Screenin) How often during the last year have you found that you were not able to stop drinking once you had started? 0 - never  5) How often during the last year have you failed to do what was normally expected from you because of drinking? 0 - never  6) How often during the last year have you needed a first drink in the morning to get yourself going after a heavy drinking session?  0 - never  7) How often during the last year have you had a feeling of guilt or remorse after drinking? 0 - never  8) How often during the last year have you been unable to remember what happened the night before because you had been drinking? 0 - never  9) Have you or someone else been injured as a result of your drinking? 0 - no  10) Has a relative or friend or a doctor or another health worker been concerned about your drinking or suggested you cut down? 0 - no    AUDIT Score: 4  Interpretation: Low risk alcohol consumption    Single Item Drug Screening:  How often have you used an illegal drug (including marijuana) or a prescription medication for non-medical reasons in the past year? never    Single Item Drug Screen Score: 0  Interpretation: Negative screen for possible drug use disorder    Other Counseling Topics:   Calcium and vitamin D intake and regular weightbearing exercise         Neetu Dey, DO

## 2022-03-17 NOTE — PATIENT INSTRUCTIONS
Medicare Preventive Visit Patient Instructions  Thank you for completing your Welcome to Medicare Visit or Medicare Annual Wellness Visit today  Your next wellness visit will be due in one year (3/18/2023)  The screening/preventive services that you may require over the next 5-10 years are detailed below  Some tests may not apply to you based off risk factors and/or age  Screening tests ordered at today's visit but not completed yet may show as past due  Also, please note that scanned in results may not display below  Preventive Screenings:  Service Recommendations Previous Testing/Comments   Colorectal Cancer Screening  * Colonoscopy    * Fecal Occult Blood Test (FOBT)/Fecal Immunochemical Test (FIT)  * Fecal DNA/Cologuard Test  * Flexible Sigmoidoscopy Age: 54-65 years old   Colonoscopy: every 10 years (may be performed more frequently if at higher risk)  OR  FOBT/FIT: every 1 year  OR  Cologuard: every 3 years  OR  Sigmoidoscopy: every 5 years  Screening may be recommended earlier than age 48 if at higher risk for colorectal cancer  Also, an individualized decision between you and your healthcare provider will decide whether screening between the ages of 74-80 would be appropriate  Colonoscopy: Not on file  FOBT/FIT: Not on file  Cologuard: Not on file  Sigmoidoscopy: Not on file          Breast Cancer Screening Age: 36 years old  Frequency: every 1-2 years  Not required if history of left and right mastectomy Mammogram: 08/24/2021    Screening Current   Cervical Cancer Screening Between the ages of 21-29, pap smear recommended once every 3 years  Between the ages of 33-67, can perform pap smear with HPV co-testing every 5 years     Recommendations may differ for women with a history of total hysterectomy, cervical cancer, or abnormal pap smears in past  Pap Smear: Not on file    Screening Not Indicated   Hepatitis C Screening Once for adults born between 1945 and 1965  More frequently in patients at high risk for Hepatitis C Hep C Antibody: 03/10/2022    Screening Current   Diabetes Screening 1-2 times per year if you're at risk for diabetes or have pre-diabetes Fasting glucose: 106 mg/dL   A1C: 5 7 %    Screening Current   Cholesterol Screening Once every 5 years if you don't have a lipid disorder  May order more often based on risk factors  Lipid panel: 03/10/2022    Screening Not Indicated  History Lipid Disorder     Other Preventive Screenings Covered by Medicare:  1  Abdominal Aortic Aneurysm (AAA) Screening: covered once if your at risk  You're considered to be at risk if you have a family history of AAA  2  Lung Cancer Screening: covers low dose CT scan once per year if you meet all of the following conditions: (1) Age 50-69; (2) No signs or symptoms of lung cancer; (3) Current smoker or have quit smoking within the last 15 years; (4) You have a tobacco smoking history of at least 30 pack years (packs per day multiplied by number of years you smoked); (5) You get a written order from a healthcare provider  3  Glaucoma Screening: covered annually if you're considered high risk: (1) You have diabetes OR (2) Family history of glaucoma OR (3)  aged 48 and older OR (3)  American aged 72 and older  3  Osteoporosis Screening: covered every 2 years if you meet one of the following conditions: (1) You're estrogen deficient and at risk for osteoporosis based off medical history and other findings; (2) Have a vertebral abnormality; (3) On glucocorticoid therapy for more than 3 months; (4) Have primary hyperparathyroidism; (5) On osteoporosis medications and need to assess response to drug therapy  · Last bone density test (DXA Scan): Not on file  5  HIV Screening: covered annually if you're between the age of 12-76  Also covered annually if you are younger than 13 and older than 72 with risk factors for HIV infection   For pregnant patients, it is covered up to 3 times per pregnancy  Immunizations:  Immunization Recommendations   Influenza Vaccine Annual influenza vaccination during flu season is recommended for all persons aged >= 6 months who do not have contraindications   Pneumococcal Vaccine (Prevnar and Pneumovax)  * Prevnar = PCV13  * Pneumovax = PPSV23   Adults 25-60 years old: 1-3 doses may be recommended based on certain risk factors  Adults 72 years old: Prevnar (PCV13) vaccine recommended followed by Pneumovax (PPSV23) vaccine  If already received PPSV23 since turning 65, then PCV13 recommended at least one year after PPSV23 dose  Hepatitis B Vaccine 3 dose series if at intermediate or high risk (ex: diabetes, end stage renal disease, liver disease)   Tetanus (Td) Vaccine - COST NOT COVERED BY MEDICARE PART B Following completion of primary series, a booster dose should be given every 10 years to maintain immunity against tetanus  Td may also be given as tetanus wound prophylaxis  Tdap Vaccine - COST NOT COVERED BY MEDICARE PART B Recommended at least once for all adults  For pregnant patients, recommended with each pregnancy  Shingles Vaccine (Shingrix) - COST NOT COVERED BY MEDICARE PART B  2 shot series recommended in those aged 48 and above     Health Maintenance Due:      Topic Date Due    Colorectal Cancer Screening  Never done    Breast Cancer Screening: Mammogram  08/24/2022    Hepatitis C Screening  Completed     Immunizations Due:      Topic Date Due    DTaP,Tdap,and Td Vaccines (2 - Td or Tdap) 01/01/2021     Advance Directives   What are advance directives? Advance directives are legal documents that state your wishes and plans for medical care  These plans are made ahead of time in case you lose your ability to make decisions for yourself  Advance directives can apply to any medical decision, such as the treatments you want, and if you want to donate organs  What are the types of advance directives?   There are many types of advance directives, and each state has rules about how to use them  You may choose a combination of any of the following:  · Living will: This is a written record of the treatment you want  You can also choose which treatments you do not want, which to limit, and which to stop at a certain time  This includes surgery, medicine, IV fluid, and tube feedings  · Durable power of  for healthcare [facility]): This is a written record that states who you want to make healthcare choices for you when you are unable to make them for yourself  This person, called a proxy, is usually a family member or a friend  You may choose more than 1 proxy  · Do not resuscitate (DNR) order:  A DNR order is used in case your heart stops beating or you stop breathing  It is a request not to have certain forms of treatment, such as CPR  A DNR order may be included in other types of advance directives  · Medical directive: This covers the care that you want if you are in a coma, near death, or unable to make decisions for yourself  You can list the treatments you want for each condition  Treatment may include pain medicine, surgery, blood transfusions, dialysis, IV or tube feedings, and a ventilator (breathing machine)  · Values history: This document has questions about your views, beliefs, and how you feel and think about life  This information can help others choose the care that you would choose  Why are advance directives important? An advance directive helps you control your care  Although spoken wishes may be used, it is better to have your wishes written down  Spoken wishes can be misunderstood, or not followed  Treatments may be given even if you do not want them  An advance directive may make it easier for your family to make difficult choices about your care  Alcohol Use and Your Health    Drinking too much can harm your health    Excessive alcohol use leads to about 88,000 death in the [place] each year, and shortens the life of those who diet by almost 30 years  Further, excessive drinking cost the economy $249 billion in 2010  Most excessive drinkers are not alcohol dependent  Excessive alcohol use has immediate effects that increase the risk of many harmful health conditions  These are most often the result of binge drinking  Over time, excessive alcohol use can lead to the development of chronic diseases and other series health problems  What is considered a "drink"? Excessive alcohol use includes:  · Binge Drinking: For women, 4 or more drinks consumed on one occasion  For men, 5 or more drinks consumed on one occasion  · Heavy Drinking: For women, 8 or more drinks per week  For men, 15 or more drinks per week  · Any alcohol used by pregnant women  · Any alcohol used by those under the age of 21 years    If you choose to drink, do so in moderation:  · Do not drink at all if you are under the age of 24, or if you are or may be pregnant, or have health problems that could be made worse by drinking    · For women, up to 1 drink per day  · For men, up to 2 drinks a day    No one should begin drinking or drink more frequently based on potential health benefits    Short-Term Health Risks:  · Injuries: motor vehicle crashes, falls, drownings, burns  · Violence: homicide, suicide, sexual assault, intimate partner violence  · Alcohol poisoning  · Reproductive health: risky sexual behaviors, unintended prengnacy, sexually transmitted diseases, miscarriage, stillbirth, fetal alcohol syndrome    Long-Term Health Risks:  · Chronic diseases: high blood pressure, heart disease, stroke, liver disease, digestive problems  · Cancers: breast, mouth and throat, liver, colon  · Learning and memory problems: dementia, poor school performance  · Mental health: depression, anxiety, insomnia  · Social problems: lost productivity, family problems, unemployment  · Alcohol dependence    For support and more information:  · Substance Abuse and [facility] [address] [place] [id number]  Web Address: https://Money-Wizards/    · Alcoholics Anonymous        Web Address: http://www [employer]/    https://www cdc gov/alcohol/fact-sheets/alcohol-use htm     © [address] 2018 Information is for End User's use only and may not be sold, redistributed or otherwise used for commercial purposes  All illustrations and images included in CareNotes® are the copyrighted property of [employer]  or [facility] Preventive Visit Patient Instructions  Thank you for completing your Welcome to Medicare Visit or Medicare Annual Wellness Visit today  Your next wellness visit will be due in one year (3/18/2023)  The screening/preventive services that you may require over the next 5-10 years are detailed below  Some tests may not apply to you based off risk factors and/or age  Screening tests ordered at today's visit but not completed yet may show as past due  Also, please note that scanned in results may not display below  Preventive Screenings:  Service Recommendations Previous Testing/Comments   Colorectal Cancer Screening  * Colonoscopy    * Fecal Occult Blood Test (FOBT)/Fecal Immunochemical Test (FIT)  * Fecal DNA/Cologuard Test  * Flexible Sigmoidoscopy Age: 54-65 years old   Colonoscopy: every 10 years (may be performed more frequently if at higher risk)  OR  FOBT/FIT: every 1 year  OR  Cologuard: every 3 years  OR  Sigmoidoscopy: every 5 years  Screening may be recommended earlier than age 48 if at higher risk for colorectal cancer  Also, an individualized decision between you and your healthcare provider will decide whether screening between the ages of 74-80 would be appropriate   Colonoscopy: Not on file  FOBT/FIT: Not on file  Cologuard: Not on file  Sigmoidoscopy: Not on file          Breast Cancer Screening Age: 36 years old  Frequency: every 1-2 years  Not required if history of left and right mastectomy Mammogram: 08/24/2021    Screening Current   Cervical Cancer Screening Between the ages of 21-29, pap smear recommended once every 3 years  Between the ages of 33-67, can perform pap smear with HPV co-testing every 5 years  Recommendations may differ for women with a history of total hysterectomy, cervical cancer, or abnormal pap smears in past  Pap Smear: Not on file    Screening Not Indicated   Hepatitis C Screening Once for adults born between 1945 and 1965  More frequently in patients at high risk for Hepatitis C Hep C Antibody: 03/10/2022    Screening Current   Diabetes Screening 1-2 times per year if you're at risk for diabetes or have pre-diabetes Fasting glucose: 106 mg/dL   A1C: 5 7 %    Screening Current   Cholesterol Screening Once every 5 years if you don't have a lipid disorder  May order more often based on risk factors  Lipid panel: 03/10/2022    Screening Not Indicated  History Lipid Disorder     Other Preventive Screenings Covered by Medicare:  6  Abdominal Aortic Aneurysm (AAA) Screening: covered once if your at risk  You're considered to be at risk if you have a family history of AAA  7  Lung Cancer Screening: covers low dose CT scan once per year if you meet all of the following conditions: (1) Age 50-69; (2) No signs or symptoms of lung cancer; (3) Current smoker or have quit smoking within the last 15 years; (4) You have a tobacco smoking history of at least 30 pack years (packs per day multiplied by number of years you smoked); (5) You get a written order from a healthcare provider  8  Glaucoma Screening: covered annually if you're considered high risk: (1) You have diabetes OR (2) Family history of glaucoma OR (3)  aged 48 and older OR (3)  American aged 72 and older  5   Osteoporosis Screening: covered every 2 years if you meet one of the following conditions: (1) You're estrogen deficient and at risk for osteoporosis based off medical history and other findings; (2) Have a vertebral abnormality; (3) On glucocorticoid therapy for more than 3 months; (4) Have primary hyperparathyroidism; (5) On osteoporosis medications and need to assess response to drug therapy  · Last bone density test (DXA Scan): Not on file  10  HIV Screening: covered annually if you're between the age of 12-76  Also covered annually if you are younger than 13 and older than 72 with risk factors for HIV infection  For pregnant patients, it is covered up to 3 times per pregnancy  Immunizations:  Immunization Recommendations   Influenza Vaccine Annual influenza vaccination during flu season is recommended for all persons aged >= 6 months who do not have contraindications   Pneumococcal Vaccine (Prevnar and Pneumovax)  * Prevnar = PCV13  * Pneumovax = PPSV23   Adults 25-60 years old: 1-3 doses may be recommended based on certain risk factors  Adults 72 years old: Prevnar (PCV13) vaccine recommended followed by Pneumovax (PPSV23) vaccine  If already received PPSV23 since turning 65, then PCV13 recommended at least one year after PPSV23 dose  Hepatitis B Vaccine 3 dose series if at intermediate or high risk (ex: diabetes, end stage renal disease, liver disease)   Tetanus (Td) Vaccine - COST NOT COVERED BY MEDICARE PART B Following completion of primary series, a booster dose should be given every 10 years to maintain immunity against tetanus  Td may also be given as tetanus wound prophylaxis  Tdap Vaccine - COST NOT COVERED BY MEDICARE PART B Recommended at least once for all adults  For pregnant patients, recommended with each pregnancy     Shingles Vaccine (Shingrix) - COST NOT COVERED BY MEDICARE PART B  2 shot series recommended in those aged 48 and above     Health Maintenance Due:      Topic Date Due    Colorectal Cancer Screening  Never done    Breast Cancer Screening: Mammogram  08/24/2022    Hepatitis C Screening  Completed     Immunizations Due:      Topic Date Due    DTaP,Tdap,and Td Vaccines (2 - Td or Tdap) 01/01/2021     Advance Directives   What are advance directives? Advance directives are legal documents that state your wishes and plans for medical care  These plans are made ahead of time in case you lose your ability to make decisions for yourself  Advance directives can apply to any medical decision, such as the treatments you want, and if you want to donate organs  What are the types of advance directives? There are many types of advance directives, and each state has rules about how to use them  You may choose a combination of any of the following:  · Living will: This is a written record of the treatment you want  You can also choose which treatments you do not want, which to limit, and which to stop at a certain time  This includes surgery, medicine, IV fluid, and tube feedings  · Durable power of  for healthcare [facility]): This is a written record that states who you want to make healthcare choices for you when you are unable to make them for yourself  This person, called a proxy, is usually a family member or a friend  You may choose more than 1 proxy  · Do not resuscitate (DNR) order:  A DNR order is used in case your heart stops beating or you stop breathing  It is a request not to have certain forms of treatment, such as CPR  A DNR order may be included in other types of advance directives  · Medical directive: This covers the care that you want if you are in a coma, near death, or unable to make decisions for yourself  You can list the treatments you want for each condition  Treatment may include pain medicine, surgery, blood transfusions, dialysis, IV or tube feedings, and a ventilator (breathing machine)  · Values history: This document has questions about your views, beliefs, and how you feel and think about life  This information can help others choose the care that you would choose  Why are advance directives important?   An advance directive helps you control your care  Although spoken wishes may be used, it is better to have your wishes written down  Spoken wishes can be misunderstood, or not followed  Treatments may be given even if you do not want them  An advance directive may make it easier for your family to make difficult choices about your care  Alcohol Use and Your Health    Drinking too much can harm your health  Excessive alcohol use leads to about 88,000 death in the [place] each year, and shortens the life of those who diet by almost 30 years  Further, excessive drinking cost the economy $249 billion in 2010  Most excessive drinkers are not alcohol dependent  Excessive alcohol use has immediate effects that increase the risk of many harmful health conditions  These are most often the result of binge drinking  Over time, excessive alcohol use can lead to the development of chronic diseases and other series health problems  What is considered a "drink"? Excessive alcohol use includes:  · Binge Drinking: For women, 4 or more drinks consumed on one occasion  For men, 5 or more drinks consumed on one occasion  · Heavy Drinking: For women, 8 or more drinks per week  For men, 15 or more drinks per week  · Any alcohol used by pregnant women  · Any alcohol used by those under the age of 21 years    If you choose to drink, do so in moderation:  · Do not drink at all if you are under the age of 24, or if you are or may be pregnant, or have health problems that could be made worse by drinking    · For women, up to 1 drink per day  · For men, up to 2 drinks a day    No one should begin drinking or drink more frequently based on potential health benefits    Short-Term Health Risks:  · Injuries: motor vehicle crashes, falls, drownings, burns  · Violence: homicide, suicide, sexual assault, intimate partner violence  · Alcohol poisoning  · Reproductive health: risky sexual behaviors, unintended prengnacy, sexually transmitted diseases, miscarriage, stillbirth, fetal alcohol syndrome    Long-Term Health Risks:  · Chronic diseases: high blood pressure, heart disease, stroke, liver disease, digestive problems  · Cancers: breast, mouth and throat, liver, colon  · Learning and memory problems: dementia, poor school performance  · Mental health: depression, anxiety, insomnia  · Social problems: lost productivity, family problems, unemployment  · Alcohol dependence    For support and more information:  · Substance Abuse and [facility] [address] [place]  Web Address: https://dermSearch/    · Alcoholics Anonymous        Web Address: http://www bhatia info/    https://www cdc gov/alcohol/fact-sheets/alcohol-use htm     © [address] [facility] 2018 Information is for End User's use only and may not be sold, redistributed or otherwise used for commercial purposes   All illustrations and images included in CareNotes® are the copyrighted property of A D A M , Inc  or [address] [place] [address]

## 2022-03-17 NOTE — ASSESSMENT & PLAN NOTE
Management per Endo Dr Zoe Farmer in [place], [place]  Sees yearly  Next appt 10/22  On Reclast - last infusion 10/21, next infusion due 10/22  Last Dexa 2020

## 2022-03-17 NOTE — ASSESSMENT & PLAN NOTE
Unstable  Patient declines starting statin today despite increased ASCVD risk  Would Rx Lipitor 20mg QHS  Recommend lifestyle modifications      The 10-year ASCVD risk score (Stephen Gomes et al , 2013) is: 8%    Values used to calculate the score:      Age: 80 years      Sex: Female      Is Non- : No      Diabetic: No      Tobacco smoker: No      Systolic Blood Pressure: 081 mmHg      Is BP treated: No      HDL Cholesterol: 83 mg/dL      Total Cholesterol: 248 mg/dL

## 2022-03-17 NOTE — PROGRESS NOTES
Assessment/Plan:  Problem List Items Addressed This Visit        Endocrine    IFG (impaired fasting glucose)     New  Patient is considering starting metformin XR 1500 mg daily  She will call for Rx and Rx for CMP 1 month after starting Rx if interested  Advise lifestyle modifications  Relevant Orders    Ambulatory Referral to Diabetic Education    Comprehensive metabolic panel    Hemoglobin A1C    Other specified hypothyroidism     New  Start Synthroid 25 mcg daily  Pending labs  Relevant Medications    levothyroxine (Synthroid) 25 mcg tablet    Other Relevant Orders    TSH, 3rd generation with Free T4 reflex    Thyroid Antibodies Panel    Thyroglobulin Panel    Anti-microsomal antibody       Musculoskeletal and Integument    Osteoporosis     Management per Endo Dr Lanie Henriquez in [place], [place]  Sees yearly  Next appt 10/22  On Reclast - last infusion 10/21, next infusion due 10/22  Last Dexa 2020  Relevant Orders    DXA bone density spine hip and pelvis       Other    Other hyperlipidemia     Unstable  Patient declines starting statin today despite increased ASCVD risk  Would Rx Lipitor 20mg QHS  Recommend lifestyle modifications  The 10-year ASCVD risk score (Kelsey Delgadillo et al , 2013) is: 8%    Values used to calculate the score:      Age: 80 years      Sex: Female      Is Non- : No      Diabetic: No      Tobacco smoker: No      Systolic Blood Pressure: 710 mmHg      Is BP treated: No      HDL Cholesterol: 83 mg/dL      Total Cholesterol: 248 mg/dL           Relevant Orders    Ambulatory Referral to Diabetic Education    Comprehensive metabolic panel    TSH, 3rd generation with Free T4 reflex    LDL cholesterol, direct    Other insomnia     Stable on Trazodone 50mg 1/2 tab QHS PRN - usually uses nightly  Recommend lifestyle modifications           Relevant Medications    traZODone (DESYREL) 50 mg tablet    History of left breast cancer     In remission  Patient no longer following with Hematology-Oncology  Mammogram is up-to-date  Relevant Orders    Mammo screening bilateral w 3d & cad    Metatarsalgia of right foot     Management per Podiatry  F/U PRN  Other Visit Diagnoses     Medicare annual wellness visit, initial    -  Primary    Adjustment disorder with mixed anxiety and depressed mood        Relevant Medications    traZODone (DESYREL) 50 mg tablet    Encounter for screening mammogram for breast cancer        Relevant Orders    Mammo screening bilateral w 3d & cad    Encounter for immunization        Relevant Medications    tetanus-diphtheria-acellular pertussis (ADACEL) 5-2-15 5 LF-mcg/0 5 injection    Dysphagia, unspecified type        Relevant Medications    pantoprazole (PROTONIX) 20 mg tablet    Other Relevant Orders    Ambulatory Referral to Otolaryngology    Magnesium    Start Protonix 20mg QD as symptoms may be due to LPR  Pending ENT evaluation for possible L-scope  Watch GERD diet  Need for vaccination        Relevant Medications    tetanus-diphtheria-acellular pertussis (ADACEL) 5-2-15 5 LF-mcg/0 5 injection           Return in about 4 months (around 7/17/2022) for 4mo- IFG, HL, Hypothyroid, Insomnia, Labs  Future Appointments   Date Time Provider Carlie Gallegos   7/19/2022  9:00 AM Юлия Maxwell DO [facility]        Subjective:     Jodee Trejo is a 80 y o  female who presents today for a follow-up on her chronic medical conditions  HPI:  Chief Complaint   Patient presents with   [facility] Wellness Visit    Labs Only     [address] Other     Last colonoscopy 12/21 - normal per patient  WHERE     Oral Swelling     REPORTS FEELS CLOSED IN DIFFICULT TO SWALLOW      -- Above per clinical staff and reviewed  --      HPI      Today:    Return in about 6 weeks (around 3/10/2022) for iAWV/ F/U HL, Insomnia, AR?, Labs        Rash - Referred to Derm on 1/27/22 - no appt scheduled  On Back - Symptoms x over 1 year, intermittently  Last seen by Derm 6/21 who Rx - Fluocinonide 0 05% BID PRN which helps the itching, but the rash never resolves  No pain  Seen by 6/21 by Messi Sheridan - unsure of Dx  Not other Rx        Post-nasal drip - Symptoms x couple months  Patient states Ketty Anna helpful in the past as she feels she has a bacterial and not a virus  Mucinex unhelpful  Pushing fluids c oil of oregano unhelpful  Advise Tiffani Milner med sinus rinse kit, Claritin, Mucinex s benefit  Doxycyline 100mg BID x 10 days unhelpful  Her throat feels thick and she has difficulty swallowing pills, water, able to swallow water, throat and mouth feel dry  She stopped dairy s change        Right Foot Metatarslagia - Management per [facility] Dr Liane Nichols  Next appt PRN for CSI great toe  IFG - No Metformin previously        Hyperlipidemia - No statin previously  Watching diet - eating less carbs and sugar  +Regular exercise - Exercise class for 1 hour, 4 times per week  Walks occasionally  Has access to a gym           Left Breast Cancer - Dx 2014  No longer seeing Heme/Onc as care completed     S/p left mastectomy, tram flap procedure, fat transplants c necrosis, implants  No CTX or RTX  Patient on Tamoxifen for 2 months in 2014 as she could not tolerate side effects  She has yearly B/L screening mammograms      Insomnia - Worsening after the death of her sister and recent lab results  On Trazodone 50mg 1/2 tab QHS PRN -usually uses nightly, which is helpful  She awakens once for nocturia then awakens, falls back asleep for minutes to hours   snores  Drinks 12 oz caffeine daily  No higher dose of Trazodone or other sleep meds previously  Last attended marriage counseling - 2012 - helpful  Good social supports    No SI/HI/AH/VH             PHQ-2/9 Depression Screening    Little interest or pleasure in doing things: 0 - not at all  Feeling down, depressed, or hopeless: 0 - not at all  PHQ-2 Score: 0  PHQ-2 Interpretation: Negative depression screen         Osteoporosis - Management per Endo Dr Kevyn Lynch in [place], [place]  Sees yearly  Next appt 10/22  On Reclast - last infusion 10/21, next infusion due 10/22  Last Dexa 2020             Last colonoscopy 12/21 - normal per patient          From previous note:    Rash - On Back - Symptoms x over 1 year, intermittently  Last seen by Derm 6/21 who Rx - Fluocinonide 0 05% BID PRN which helps the itching, but the rash never resolves  No pain  Seen by 6/21 by Jerri Marion - unsure of Dx  Not other Rx        Post-nasal drip - Symptoms x couple month  Patient states Nanda Holley helpful in the past as she feels she has a bacterial and not a virus  Mucinex unhelpful  Pushing fluids c oil of oregano unhelpful        Right Foot Metatarslagia - Management per [facility] Dr Mya Stringer  Next appt 2/22        Hyperlipidemia - No statin previously  Watching diet  No regular exercise  Walks occasionally  Has access to a gym           Left Breast Cancer - Dx 2014  No longer seeing Heme/Onc as care completed     S/p left mastectomy, tram flap procedure, fat transplants c necrosis, implants  No CTX or RTX  Patient on Tamoxifen for 2 months in 2014 as she could not tolerate side effects         Insomnia - On Trazodone 50mg 1/2 tab QHS PRN -usually uses nightly, which is helpful  She awakens once for nocturia then awakens, falls back asleep for minutes to hours   snores  Drinks 12 oz caffeine daily  No higher dose of Trazodone or other sleep meds previously  Last attended marriage counseling - 2012 - helpful  Good social supports    No SI/HI/AH/VH         PHQ-2/9 Depression Screening       Little interest or pleasure in doing things: 0 - not at all  Feeling down, depressed, or hopeless: 0 - not at all  Trouble falling or staying asleep, or sleeping too much: 0 - not at all  Feeling tired or having little energy: 0 - not at all  Poor appetite or overeatin - not at all  Feeling bad about yourself - or that you are a failure or have let yourself or your family down: 0 - not at all  Trouble concentrating on things, such as reading the newspaper or watching television: 0 - not at all  Moving or speaking so slowly that other people could have noticed  Or the opposite - being so fidgety or restless that you have been moving around a lot more than usual: 0 - not at all  Thoughts that you would be better off dead, or of hurting yourself in some way: 0 - not at all  PHQ-2 Score: 0  PHQ-2 Interpretation: Negative depression screen  PHQ-9 Score: 0   PHQ-9 Interpretation: No or Minimal depression                 KIMMY-7 Flowsheet Screening      Most Recent Value   Over the last 2 weeks, how often have you been bothered by any of the following problems?     Feeling nervous, anxious, or on edge 1   Not being able to stop or control worrying 0   Worrying too much about different things 0   Trouble relaxing 0   Being so restless that it is hard to sit still 0   Becoming easily annoyed or irritable 0   Feeling afraid as if something awful might happen 0   KIMMY-7 Total Score 1          MDQ:  0, Asynchronous, No Problem     Osteoporosis - Management per Endo Dr Carlos Hutson in [place], [place]  Sees yearly  Next appt 10/22  On Reclast - last infusion 10/21, next infusion due 10/22  Last Dexa           Reviewed:  Labs 3/10/22     No Gyn      Last colonoscopy  - normal per patient              The following portions of the patient's history were reviewed and updated as appropriate: allergies, current medications, past family history, past medical history, past social history, past surgical history and problem list       Review of Systems   Constitutional: Positive for fatigue (Due to insomnia)  Negative for appetite change, chills, diaphoresis and fever  HENT: Positive for postnasal drip and trouble swallowing      Respiratory: Negative for chest tightness and shortness of breath  Cardiovascular: Negative for chest pain  Gastrointestinal: Negative for abdominal pain, blood in stool, diarrhea, nausea and vomiting  Genitourinary: Negative for dysuria  Current Outpatient Medications   Medication Sig Dispense Refill    ascorbic acid (VITAMIN C) 1000 MG tablet Take 1,000 mg by mouth daily      Biotin 300 MCG TABS Take 1 tablet by mouth in the morning        Calcium Carb-Cholecalciferol 600-800 MG-UNIT TABS Take 1 tablet by mouth 2 (two) times a day        coenzyme Q-10 100 MG capsule Take 100 mg by mouth daily      Multiple Vitamin (Tab-A-Selene) TABS Take 1 tablet by mouth daily      Omega-3 Fatty Acids (fish oil) 1,000 mg Take 2,000 mg by mouth daily      traZODone (DESYREL) 50 mg tablet Take 0 5-1 tablets (25-50 mg total) by mouth daily at bedtime as needed for sleep 90 tablet 2    Turmeric 500 MG CAPS Take 1 capsule by mouth in the morning        Zoledronic Acid (RECLAST IV) 1 dose IV yearly   fluocinonide (LIDEX) 0 05 % cream Apply 1 application topically 2 (two) times a day as needed for rash Rx per Derm (Patient not taking: Reported on 3/17/2022 )      levothyroxine (Synthroid) 25 mcg tablet Take 1 tablet (25 mcg total) by mouth daily in the early morning 1 pill by mouth once daily on an empty stomach, 30 minutes prior to eating food or drink  30 tablet 1    pantoprazole (PROTONIX) 20 mg tablet Take 1 tablet (20 mg total) by mouth daily before breakfast 30 tablet 1    tetanus-diphtheria-acellular pertussis (ADACEL) 5-2-15 5 LF-mcg/0 5 injection Inject 0 5 mL into a muscle once for 1 dose 0 5 mL 0    Zoster Vac Recomb Adjuvanted (Shingrix) 50 MCG/0 5ML SUSR 0 5mL IM for one dose, followed by 0 5mL IM 2-6 months after first dose (Patient not taking: Reported on 3/17/2022 ) 1 each 1     No current facility-administered medications for this visit         Objective:  /66   Pulse 83   Temp 97 7 °F (36 5 °C)   Resp 16 Ht 5' 5 08" (1 653 m)   Wt 66 1 kg (145 lb 12 8 oz)   SpO2 99%   BMI 24 20 kg/m²    Wt Readings from Last 3 Encounters:   03/17/22 66 1 kg (145 lb 12 8 oz)   01/27/22 66 2 kg (146 lb)      BP Readings from Last 3 Encounters:   03/17/22 118/66   01/27/22 110/64          Physical Exam  Vitals and nursing note reviewed  Constitutional:       Appearance: Normal appearance  She is well-developed and normal weight  HENT:      Head: Normocephalic and atraumatic  Right Ear: Tympanic membrane, ear canal and external ear normal       Left Ear: Tympanic membrane, ear canal and external ear normal       Nose: Nose normal       Right Sinus: No maxillary sinus tenderness or frontal sinus tenderness  Left Sinus: No maxillary sinus tenderness or frontal sinus tenderness  Mouth/Throat:      Mouth: Mucous membranes are moist       Pharynx: Oropharynx is clear  Uvula midline  No oropharyngeal exudate or posterior oropharyngeal erythema  Tonsils: No tonsillar exudate  Eyes:      Conjunctiva/sclera: Conjunctivae normal    Cardiovascular:      Rate and Rhythm: Normal rate and regular rhythm  Pulses: Normal pulses  Heart sounds: Normal heart sounds  Pulmonary:      Effort: Pulmonary effort is normal       Breath sounds: Normal breath sounds  Musculoskeletal:         General: No swelling or tenderness  Cervical back: Neck supple  Right lower leg: No edema  Left lower leg: No edema  Lymphadenopathy:      Cervical: No cervical adenopathy  Skin:     Findings: No rash  Neurological:      General: No focal deficit present  Mental Status: She is alert and oriented to person, place, and time  Psychiatric:         Attention and Perception: Attention and perception normal          Mood and Affect: Mood is depressed  Affect is tearful  Speech: Speech normal          Behavior: Behavior normal  Behavior is cooperative  Thought Content:  Thought content normal  Cognition and Memory: Cognition and memory normal          Judgment: Judgment normal          Lab Results:      Lab Results   Component Value Date    WBC 7 89 03/10/2022    HGB 14 1 03/10/2022    HCT 41 5 03/10/2022     03/10/2022    TRIG 116 03/10/2022    HDL 83 03/10/2022    LDLDIRECT 153 (H) 03/10/2022    ALT 26 03/10/2022    AST 18 03/10/2022    K 3 6 03/10/2022     03/10/2022    CREATININE 0 81 03/10/2022    BUN 17 03/10/2022    CO2 27 03/10/2022    GLUF 106 (H) 03/10/2022    HGBA1C 5 7 (H) 03/10/2022     No results found for: URICACID  Invalid input(s): BASENAME Vitamin D    No results found       POCT Labs

## 2022-03-18 ENCOUNTER — TELEPHONE (OUTPATIENT)
Dept: ADMINISTRATIVE | Facility: OTHER | Age: 72
End: 2022-03-18

## 2022-03-18 ENCOUNTER — TELEPHONE (OUTPATIENT)
Dept: FAMILY MEDICINE CLINIC | Facility: CLINIC | Age: 72
End: 2022-03-18

## 2022-03-18 NOTE — TELEPHONE ENCOUNTER
----- Message from Susan Davila MA sent at 3/18/2022 10:51 AM EDT -----  Regarding: [facility]  03/18/22 10:51 AM    Hello, our patient Yumiko Medina has had CRC: Colonoscopy completed/performed   Please assist in updating the patient chart by making an External outreach to     [address], [address] [place], [place] [address]  [phone]  The date of service is UNKNOWN    Thank you,  Ssuan Davila MA  [facility]  [facility]

## 2022-03-18 NOTE — LETTER
Procedure Request Form: Colonoscopy      Date Requested: 22  Patient: Kavin Watsonscal  Patient : 1950   Referring Provider: Anselm Stevan, DO        Date of Procedure ______________________________       The above patient has informed us that they have completed their   most recent Colonoscopy at your facility  Please complete   this form and attach all corresponding procedure reports/results  Comments __________________________________________________________  ____________________________________________________________________  ____________________________________________________________________  ____________________________________________________________________    Facility Completing Procedure _________________________________________    Form Completed By (print name) _______________________________________      Signature __________________________________________________________      These reports are needed for  compliance  Please fax this completed form and a copy of the procedure report to our office located at [place] [address] as soon as possible to [phone] nita Hernandez Free: Phone [phone]    We thank you for your assistance in treating our mutual patient

## 2022-03-18 NOTE — TELEPHONE ENCOUNTER
----- Message from Toño Barreto sent at 3/17/2022  5:02 PM EDT -----  Regarding: Colonoscopy information  Here is the contact for my most recent colonoscopy    [facility]  [facility]  [address], [facility], [address] [place] [address]  [phone]

## 2022-03-21 ENCOUNTER — TELEPHONE (OUTPATIENT)
Dept: ADMINISTRATIVE | Facility: OTHER | Age: 72
End: 2022-03-21

## 2022-03-21 PROBLEM — Z86.010 HISTORY OF COLON POLYPS: Status: ACTIVE | Noted: 2020-12-10

## 2022-03-21 NOTE — TELEPHONE ENCOUNTER
----- Message from Nabila Lewis MA sent at 3/18/2022 12:10 PM EDT -----  Regarding: [facility] fp  03/18/22 12:10 PM    Hello, our patient Stanley Meigs has had CRC: Colonoscopy completed/performed  Please assist in updating the patient chart by pulling a previous Electronic Medical Record (EMR) document  The previous EMR is MEDIA   The date of service is 12/10/20    Thank you,  Nabila Lewis MA  [facility]

## 2022-03-21 NOTE — TELEPHONE ENCOUNTER
----- Message from Nichol Carlos DO sent at 3/21/2022 10:27 AM EDT -----  Regarding: Colonscopy 12/10/20 -  See Path in Media Tab  03/21/22 10:27 AM    Hello, our patient Galileo Dotson has had CRC: Colonoscopy completed/performed  Please assist in updating the patient chart by pulling the document from the Media Tab  The date of service is        Colonscopy 12/10/20 -  See Path in Media Tab    Thank you,  Per Butt DO  [facility]

## 2022-03-21 NOTE — TELEPHONE ENCOUNTER
Upon review of the In Basket request and the patient's chart, initial outreach has been made via fax, please see Contacts section for details       Thank you  Skylar Langley MA

## 2022-03-21 NOTE — TELEPHONE ENCOUNTER
Upon review of the In Basket request we have found/obtained the documentation  After careful review of the document we are unable to complete this request for CRC: Colonoscopy because the documentation does not have the proper verbiage (wording) needed to close the requested care gap(s)  Colonoscopy report is needed to close care gap  Any additional questions or concerns should be emailed to the Practice Liaisons via [email or web address] email, please do not reply via In Basket      Thank you  Rizwana Jasso

## 2022-03-22 NOTE — TELEPHONE ENCOUNTER
Upon review of the In Basket request we colonoscopy in     Any additional questions or concerns should be emailed to the Practice Liaisons via [email or web address] email, please do not reply via In Basket      Thank you  Jaime Guerrero MA

## 2022-03-23 NOTE — TELEPHONE ENCOUNTER
Upon review of the In Basket request we have found this is a duplicate request and no further action is needed  This request was completed upon initial request, the patient chart is up to date, and this message will now be closed  Any additional questions or concerns should be emailed to the Practice Liaisons via [email or web address] email, please do not reply via In Basket      Thank you  Thaddeus Johnson MA

## 2022-04-28 ENCOUNTER — LAB (OUTPATIENT)
Dept: LAB | Facility: CLINIC | Age: 72
End: 2022-04-28
Payer: MEDICARE

## 2022-04-28 DIAGNOSIS — E03.8 OTHER SPECIFIED HYPOTHYROIDISM: ICD-10-CM

## 2022-04-28 LAB
T4 FREE SERPL-MCNC: 1.02 NG/DL (ref 0.76–1.46)
TSH SERPL DL<=0.05 MIU/L-ACNC: 4.81 UIU/ML (ref 0.45–4.5)

## 2022-04-28 PROCEDURE — 84439 ASSAY OF FREE THYROXINE: CPT

## 2022-04-28 PROCEDURE — 86800 THYROGLOBULIN ANTIBODY: CPT

## 2022-04-28 PROCEDURE — 36415 COLL VENOUS BLD VENIPUNCTURE: CPT

## 2022-04-28 PROCEDURE — 86376 MICROSOMAL ANTIBODY EACH: CPT

## 2022-04-28 PROCEDURE — 84443 ASSAY THYROID STIM HORMONE: CPT

## 2022-04-28 PROCEDURE — 84432 ASSAY OF THYROGLOBULIN: CPT

## 2022-04-28 NOTE — RESULT ENCOUNTER NOTE
HypOthyroidism - Thyroid is still underactive, but improving  Increase Synthroid 50mcg daily  ERx sent to pharmacy  Nurse to see lab Rx to repeat thyroid labs in 6 weeks  Message sent to patient via [employer] patient portal     Nurse to also call patient

## 2022-04-29 LAB
THYROGLOB AB SERPL-ACNC: <1 IU/ML (ref 0–0.9)
THYROGLOB AB SERPL-ACNC: <1 IU/ML (ref 0–0.9)
THYROGLOB SERPL-MCNC: 6.7 NG/ML (ref 1.5–38.5)
THYROPEROXIDASE AB SERPL-ACNC: <8 IU/ML (ref 0–34)

## 2022-05-05 ENCOUNTER — HOSPITAL ENCOUNTER (OUTPATIENT)
Dept: RADIOLOGY | Facility: HOSPITAL | Age: 72
Discharge: HOME | End: 2022-05-05
Attending: OTOLARYNGOLOGY
Payer: MEDICARE

## 2022-05-05 DIAGNOSIS — R13.10 DYSPHAGIA, UNSPECIFIED TYPE: ICD-10-CM

## 2022-05-05 PROCEDURE — 74230 X-RAY XM SWLNG FUNCJ C+: CPT

## 2022-05-05 PROCEDURE — 92611 MOTION FLUOROSCOPY/SWALLOW: CPT

## 2022-05-05 NOTE — PROCEDURES
Video Swallow Study      Patient Name: Marj Bergeron  [id number] Date: 5/5/2022        Past Medical History  Past Medical History:   Diagnosis Date    COVID-19 01/10/2022    Disease of thyroid gland     History of colon polyps 12/10/2020    History of left breast cancer 05/2014    S/p left mastectomy, trans flap procedure, fat transplants c necrosis, implants  No CTX or RTX   IFG (impaired fasting glucose)     Osteoporosis     Other hyperlipidemia     Other insomnia     Other specified hypothyroidism 03/17/2022        Past Surgical History  Past Surgical History:   Procedure Laterality Date    APPENDECTOMY  10/1959    BREAST IMPLANT Bilateral 2016    KNEE SURGERY Right 03/2018    Tibial Plateau Fracture - Plate and 5 Screws in place    MASTECTOMY Left 06/2014    Left Breast Cancer    RECONSTRUCTION BREAST W/ TRAM FLAP Left 06/2014    With fat graft x 4 due to necrosis    SHOULDER SURGERY Right 11/2020    [employer] - Calcification Removal - [address]    TUBAL LIGATION  03/1978           Video Barium Swallow Study    Summary:  Images are on PACS for review  Pt presents w/ [facility] oropharyngeal swallow skill  No significant oral difficulties  Swallow initiation w/ mild delay, all material spilling to the valleculae and thin to the pyriforms though airway remains protected despite delay  No significant pharyngeal retention  No penetration or aspiration on today's study  Pt w/ suspected CP prominence/tight CP- slow bolus passage though does not impact bolus clearance  Barium tablet retained above UES briefly, requiring liquid wash to clear  Per gross esophageal screen:  Noted pill stasis, eventually cleared to stomach  VBS findings and recommendations immediately reviewed w/ pt w/ use of images to aid in understanding   Education provided on strategies to optimize swallow safety, including the importance of oral care and s/s aspiration to monitor for and notify medical team of should they arise  Pt verbalized understanding and denied questions at this time  Recommendations:  Diet: Regular   Liquids: Thin   Meds: Whole w/ liquids  Strategies: Alternate liquids/solids   Upright position  F/u ST tx: No ST f/u indicated at this time  Consider consult with: GI   Results reviewed with: pt  If a dedicated assessment of the esophagus is desired, consider esophagram/barium swallow or EGD  Patient's goal: "It just feels so thick all the time"       Goals:  Pt will tolerate least restrictive diet w/out s/s aspiration or oral/pharyngeal difficulties  H&P/pertinent provider notes: (PMH noted above)  Syd Sun is a 80 y o  female      Patient referred by Dr Lennox Mcleod for dysphagia x 5 months  Patient denies any pain  Her throat feels "thicker", her mouth feels dry, and it feels hard to get things down         The following portions of the patient's history were reviewed and updated as appropriate: allergies, current medications, past family history, past medical history, past social history, past surgical history and problem list     Special Studies:  Flexible Fiberoptic Nasolaryngoscopy Procedure Note 4/15/22:  Indication:  dysphagia  Verbal consent obtained  Surgeon: Brendan Tang MD  Anesthesia: 4% lidocaine, oxymetazoline  Scope passed through nasal cavities bilaterally  Right Nasal Cavity:  Mucosa: normal  Secretions: clear  Left Nasal Cavity:  Mucosa: normal  Secretions: clear  Nasopharynx: unremarkable  Oropharynx: unremarkable  Hypopharynx/Larynx:              Vocal fold mobility = nl              Laryngeal edema  = none              Laryngeal erythema = none              Vocal folds = nl              Valleculae= clear              Piriform Sinuses= clear  Other findings = DNS  Patient tolerated procedure well without complications    Previous VBS:  No        Does the pt have pain?    If yes, was nursing made aware/was it addressed? Precautions:  -    Food Allergies:  None    Current Diet:   Regular w/ thin     Dentition:  Adequate    O2 requirement:  RA    Oral mech:  Strength and ROM: WFL     Vocal Quality/Speech:  Clear/adequate    Cognitive status:  Awake, alert, oriented    Consistencies administered: Puree, soft solid, hard solid, thin liquid, barium tablet with thin  Liquids were administered/taken by straw/cup  Pt was seated laterally at 90 degrees  Oral stage:  Lip closure: wfl   Mastication: wfl   Bolus formation: wfl   Bolus control: wfl   Transfer: wfl   Residue: min w/ solids, cleared w/ independent secondary transfer     Pharyngeal stage:  Swallow promptness: mild delay w/ all   Spill to valleculae: w/ all   Spill to pyriforms: w/ thin   Epiglottic inversion: complete, sluggish return   Laryngeal excursion: wfl   Pharyngeal constriction: wfl   Vallecular retention: min w/ solids   Pyriform retention: no   PPW coating: no   Osteophytes: ? Bony growths C5/C6  CP prominence: yes  Retropulsion from prominence: no though barium tablet briefly retained above UES   Transient penetration: no   Epiglottic undercoat: no   Penetration: no   Aspiration: no   Strategies: N/A   Response to aspiration: N/A    Screening of Esophageal stage:  Dysmotility: -   Slow motility: -   Retropulsion: -   Possible reflux: -   Retention/Stasis: pill stasis x2, eventually passed to stomach   Possible stricture: ?  Yes   LES: -

## 2022-05-22 DIAGNOSIS — E03.8 OTHER SPECIFIED HYPOTHYROIDISM: ICD-10-CM

## 2022-05-22 RX ORDER — LEVOTHYROXINE SODIUM 0.05 MG/1
TABLET ORAL
Qty: 30 TABLET | Refills: 1 | OUTPATIENT
Start: 2022-05-22

## 2022-05-23 NOTE — TELEPHONE ENCOUNTER
Medication refill requested: levothyroxine  Last refilled: 4/28/22 #30x1  Labs: Yes, describe: patient is due for 6 week labs from last TSH   [facility] (select pharmacy send RX to):   [facility]/pharmacy #[id number]- [place] - [address] [address]  Phone: [phone] Fax: [phone]          HypOthyroidism - Thyroid is still underactive, but improving   Increase Synthroid 50mcg daily    ERx sent to pharmacy   Nurse to see lab Rx to repeat thyroid labs in 6 weeks

## 2022-06-17 ENCOUNTER — LAB (OUTPATIENT)
Dept: LAB | Facility: CLINIC | Age: 72
End: 2022-06-17
Payer: MEDICARE

## 2022-06-17 DIAGNOSIS — E03.8 OTHER SPECIFIED HYPOTHYROIDISM: ICD-10-CM

## 2022-06-17 LAB — TSH SERPL DL<=0.05 MIU/L-ACNC: 2.06 UIU/ML (ref 0.45–4.5)

## 2022-06-17 PROCEDURE — 36415 COLL VENOUS BLD VENIPUNCTURE: CPT

## 2022-06-17 PROCEDURE — 84443 ASSAY THYROID STIM HORMONE: CPT

## 2022-06-17 NOTE — RESULT ENCOUNTER NOTE
Normal thyroid labs  Continue current dose of thyroid medication  Please contact the office for refill as it is needed prior to next appointment  Message sent to patient via [facility] patient portal     Nurse to also call patient

## 2022-06-22 ENCOUNTER — RA CDI HCC (OUTPATIENT)
Dept: OTHER | Facility: HOSPITAL | Age: 72
End: 2022-06-22

## 2022-06-22 NOTE — PROGRESS NOTES
[address]  coding opportunities       Chart reviewed, no opportunity found: CHART REVIEWED, NO OPPORTUNITY FOUND        Patients Insurance     Medicare Insurance: Medicare

## 2022-06-26 DIAGNOSIS — E03.8 OTHER SPECIFIED HYPOTHYROIDISM: ICD-10-CM

## 2022-06-27 ENCOUNTER — PATIENT MESSAGE (OUTPATIENT)
Dept: FAMILY MEDICINE CLINIC | Facility: CLINIC | Age: 72
End: 2022-06-27

## 2022-06-27 DIAGNOSIS — E03.8 OTHER SPECIFIED HYPOTHYROIDISM: ICD-10-CM

## 2022-06-27 RX ORDER — LEVOTHYROXINE SODIUM 0.05 MG/1
TABLET ORAL
Qty: 30 TABLET | Refills: 1 | OUTPATIENT
Start: 2022-06-27

## 2022-07-05 RX ORDER — LEVOTHYROXINE SODIUM 0.05 MG/1
TABLET ORAL
Qty: 30 TABLET | Refills: 5 | Status: SHIPPED | OUTPATIENT
Start: 2022-07-05 | End: 2022-08-26 | Stop reason: SDUPTHER

## 2022-07-05 NOTE — PATIENT COMMUNICATION
Medication refill requested:  Levothyroxine     Last office visit: 3/17/22  Next office visit: 7/19/22  Last refilled: 4/28/22  Labs: Yes, describe: 6/17/22  Ordering Provider: [address] (select pharmacy send RX to):   [facility]/pharmacy #[id number]- [address] [facility] [address] [address]  Phone: [phone] Fax: [phone]

## 2022-07-15 ENCOUNTER — LAB (OUTPATIENT)
Dept: LAB | Facility: CLINIC | Age: 72
End: 2022-07-15
Payer: MEDICARE

## 2022-07-15 DIAGNOSIS — R73.01 IFG (IMPAIRED FASTING GLUCOSE): ICD-10-CM

## 2022-07-15 DIAGNOSIS — R13.10 DYSPHAGIA, UNSPECIFIED TYPE: ICD-10-CM

## 2022-07-15 DIAGNOSIS — E78.49 OTHER HYPERLIPIDEMIA: ICD-10-CM

## 2022-07-15 LAB
ALBUMIN SERPL BCP-MCNC: 4.3 G/DL (ref 3.5–5)
ALP SERPL-CCNC: 43 U/L (ref 34–104)
ALT SERPL W P-5'-P-CCNC: 12 U/L (ref 7–52)
ANION GAP SERPL CALCULATED.3IONS-SCNC: 5 MMOL/L (ref 4–13)
AST SERPL W P-5'-P-CCNC: 18 U/L (ref 13–39)
BILIRUB SERPL-MCNC: 0.49 MG/DL (ref 0.2–1)
BUN SERPL-MCNC: 19 MG/DL (ref 5–25)
CALCIUM SERPL-MCNC: 9.1 MG/DL (ref 8.4–10.2)
CHLORIDE SERPL-SCNC: 107 MMOL/L (ref 96–108)
CO2 SERPL-SCNC: 27 MMOL/L (ref 21–32)
CREAT SERPL-MCNC: 0.81 MG/DL (ref 0.6–1.3)
EST. AVERAGE GLUCOSE BLD GHB EST-MCNC: 123 MG/DL
GFR SERPL CREATININE-BSD FRML MDRD: 73 ML/MIN/1.73SQ M
GLUCOSE P FAST SERPL-MCNC: 92 MG/DL (ref 65–99)
HBA1C MFR BLD: 5.9 %
LDLC SERPL DIRECT ASSAY-MCNC: 143 MG/DL (ref 0–100)
MAGNESIUM SERPL-MCNC: 2.2 MG/DL (ref 1.9–2.7)
POTASSIUM SERPL-SCNC: 3.8 MMOL/L (ref 3.5–5.3)
PROT SERPL-MCNC: 7 G/DL (ref 6.4–8.4)
SODIUM SERPL-SCNC: 139 MMOL/L (ref 135–147)
TSH SERPL DL<=0.05 MIU/L-ACNC: 2.31 UIU/ML (ref 0.45–4.5)

## 2022-07-15 PROCEDURE — 83721 ASSAY OF BLOOD LIPOPROTEIN: CPT

## 2022-07-15 PROCEDURE — 36415 COLL VENOUS BLD VENIPUNCTURE: CPT

## 2022-07-15 PROCEDURE — 83036 HEMOGLOBIN GLYCOSYLATED A1C: CPT

## 2022-07-15 PROCEDURE — 84443 ASSAY THYROID STIM HORMONE: CPT

## 2022-07-15 PROCEDURE — 83735 ASSAY OF MAGNESIUM: CPT

## 2022-07-15 PROCEDURE — 80053 COMPREHEN METABOLIC PANEL: CPT

## 2022-07-15 NOTE — RESULT ENCOUNTER NOTE
Unstable labs - will review with patient at upcoming appointment  Hyperlipidemia - To consider statin?       The 10-year ASCVD risk score (Eulalio Henning et al , 2013) is: 8%    Values used to calculate the score:      Age: 80 years      Sex: Female      Is Non- : No      Diabetic: No      Tobacco smoker: No      Systolic Blood Pressure: 776 mmHg      Is BP treated: No      HDL Cholesterol: 83 mg/dL      Total Cholesterol: 248 mg/dL

## 2022-07-19 ENCOUNTER — LAB (OUTPATIENT)
Dept: LAB | Facility: CLINIC | Age: 72
End: 2022-07-19
Payer: MEDICARE

## 2022-07-19 ENCOUNTER — OFFICE VISIT (OUTPATIENT)
Dept: FAMILY MEDICINE CLINIC | Facility: CLINIC | Age: 72
End: 2022-07-19
Payer: MEDICARE

## 2022-07-19 VITALS
HEART RATE: 84 BPM | HEIGHT: 61 IN | TEMPERATURE: 97.8 F | BODY MASS INDEX: 26.92 KG/M2 | WEIGHT: 142.6 LBS | OXYGEN SATURATION: 98 % | SYSTOLIC BLOOD PRESSURE: 110 MMHG | DIASTOLIC BLOOD PRESSURE: 68 MMHG | RESPIRATION RATE: 18 BRPM

## 2022-07-19 DIAGNOSIS — G47.09 OTHER INSOMNIA: ICD-10-CM

## 2022-07-19 DIAGNOSIS — M81.0 OSTEOPOROSIS, UNSPECIFIED OSTEOPOROSIS TYPE, UNSPECIFIED PATHOLOGICAL FRACTURE PRESENCE: ICD-10-CM

## 2022-07-19 DIAGNOSIS — R73.01 IFG (IMPAIRED FASTING GLUCOSE): Primary | ICD-10-CM

## 2022-07-19 DIAGNOSIS — Z85.3 HISTORY OF LEFT BREAST CANCER: ICD-10-CM

## 2022-07-19 DIAGNOSIS — M77.41 METATARSALGIA OF RIGHT FOOT: ICD-10-CM

## 2022-07-19 DIAGNOSIS — L81.4 SOLAR LENTIGO: ICD-10-CM

## 2022-07-19 DIAGNOSIS — Z12.83 SKIN CANCER SCREENING: ICD-10-CM

## 2022-07-19 DIAGNOSIS — R68.2 DRY MOUTH: ICD-10-CM

## 2022-07-19 DIAGNOSIS — L30.9 DERMATITIS: ICD-10-CM

## 2022-07-19 DIAGNOSIS — E78.49 OTHER HYPERLIPIDEMIA: ICD-10-CM

## 2022-07-19 DIAGNOSIS — Z86.010 HISTORY OF COLON POLYPS: ICD-10-CM

## 2022-07-19 DIAGNOSIS — E03.8 OTHER SPECIFIED HYPOTHYROIDISM: ICD-10-CM

## 2022-07-19 LAB — CRP SERPL QL: <1 MG/L

## 2022-07-19 PROCEDURE — 86038 ANTINUCLEAR ANTIBODIES: CPT

## 2022-07-19 PROCEDURE — 86140 C-REACTIVE PROTEIN: CPT

## 2022-07-19 PROCEDURE — 86200 CCP ANTIBODY: CPT

## 2022-07-19 PROCEDURE — 86235 NUCLEAR ANTIGEN ANTIBODY: CPT

## 2022-07-19 PROCEDURE — 36415 COLL VENOUS BLD VENIPUNCTURE: CPT

## 2022-07-19 PROCEDURE — 86430 RHEUMATOID FACTOR TEST QUAL: CPT

## 2022-07-19 PROCEDURE — 99214 OFFICE O/P EST MOD 30 MIN: CPT | Performed by: FAMILY MEDICINE

## 2022-07-19 NOTE — ASSESSMENT & PLAN NOTE
Management per Endo Dr Brandee Youngblood in [place], [place]  Sees yearly  Next appt 10/22  On Reclast - last infusion 10/21, next infusion due 10/22  Last Dexa 2020

## 2022-07-19 NOTE — PROGRESS NOTES
Assessment/Plan:  Problem List Items Addressed This Visit        Endocrine    IFG (impaired fasting glucose) - Primary     New  Patient declines starting metformin XR 500mg 3 pills daily  Advise lifestyle modifications  Relevant Orders    Comprehensive metabolic panel    Hemoglobin A1C    Other specified hypothyroidism     Euthyroid  Continue Synthroid 50mcg daily  Relevant Orders    TSH, 3rd generation with Free T4 reflex       Musculoskeletal and Integument    Osteoporosis     Management per Endo Dr Silverio Holman in [place], [place]  Sees yearly  Next appt 10/22  On Reclast - last infusion 10/21, next infusion due 10/22  Last Dexa 2020  Relevant Orders    TSH, 3rd generation with Free T4 reflex       Other    Other hyperlipidemia     Stable s statin  Recommend lifestyle modifications  The 10-year ASCVD risk score (Casandra Jorge et al , 2013) is: 7%    Values used to calculate the score:      Age: 80 years      Sex: Female      Is Non- : No      Diabetic: No      Tobacco smoker: No      Systolic Blood Pressure: 552 mmHg      Is BP treated: No      HDL Cholesterol: 83 mg/dL      Total Cholesterol: 248 mg/dL           Relevant Orders    Comprehensive metabolic panel    TSH, 3rd generation with Free T4 reflex    LDL cholesterol, direct    Other insomnia     Stable on Trazodone 50mg 1/2 tab QHS PRN - usually uses nightly  Recommend lifestyle modifications  Relevant Orders    TSH, 3rd generation with Free T4 reflex    History of left breast cancer     In remission  Patient no longer following with Hematology-Oncology  Mammogram is up-to-date  Metatarsalgia of right foot     Management per Podiatry  F/U PRN  History of colon polyps     Colonoscopy is up-to-date             Other Visit Diagnoses     Dry mouth        Relevant Orders    Sjogren's Antibodies    TAMEKA Screen w/ Reflex to Titer/Pattern    C-reactive protein (Completed)    Cyclic citrul peptide antibody, IgG    RF Screen w/ Reflex to Titer    Pending labs - R/O Sjogren's Disease  Push fluids, continue Biotene products  Euthyroid  BMI 27 0-27 9,adult        Solar lentigo        Relevant Orders    Ambulatory Referral to Dermatology    Dermatitis        Relevant Orders    Ambulatory Referral to Dermatology    Skin cancer screening        Relevant Orders    Ambulatory Referral to Dermatology           Return in about 4 months (around 11/19/2022) for 4mo- IFG, HL, Hypothyroid, Insomnia, Labs  Future Appointments   Date Time Provider Carlie Gallegos   8/25/2022  9:30 AM [facility]  W Aracelis Yusra   11/21/2022  9:00 AM Nichol Carlos, DO [facility]        Subjective:     Genie Tate is a 80 y o  female who presents today for a follow-up on her chronic medical conditions  HPI:  Chief Complaint   Patient presents with    Follow-up     Review labs, concerned about elevations of labs, has a spot on her left arm     Medication Refill     None at this time     HM     Mammo scheduled next month, will follow up on Tdap with pharmacy  Pt is not sure about where she got her colonoscopy from, will follow up  -- Above per clinical staff and reviewed  --      HPI      Today:      Return in about 4 months (around 7/17/2022) for 4mo- IFG, HL, Hypothyroid, Insomnia, Labs  4mo OV         Left Dorsal Wrist Dark Skin Macule - Symptoms x 1 month, increased darkness  Paul bleeding or scabbing  IFG - No Metformin previously        Hyperlipidemia - No statin previously - refuses   Watching diet - eating less carbs and sugar   +Regular exercise - Exercise class for 1 hour, 5 times per week   Walks occasionally   Has access to a gym       Hypothyroidism - Taking Synthroid regularly and properly           Left Breast Cancer - Dx 2014   No longer seeing Heme/Onc as care completed     S/p left mastectomy, tram flap procedure, fat transplants c necrosis, implants   No CTX or RTX   Patient on Tamoxifen for 2 months in 2014 as she could not tolerate side effects    She has yearly B/L screening mammograms      Insomnia - Stable, worse when there's a full moon  Worsening after the death of her sister  On Trazodone 50mg 1/2 tab QHS PRN - usually uses nightly, which is helpful   She awakens once for nocturia then awakens, falls back asleep for minutes to hours   snores   Drinks 12 oz caffeine daily   No higher dose of Trazodone or other sleep meds previously  Deana Maloney attended marriage counseling -  - helpful   Good social supports   No SI/HI/AH/VH            PHQ-2/9 Depression Screening    Little interest or pleasure in doing things: 0 - not at all  Feeling down, depressed, or hopeless: 0 - not at all  Trouble falling or staying asleep, or sleeping too much: 1 - several days  Feeling tired or having little energy: 0 - not at all  Poor appetite or overeatin - not at all  Feeling bad about yourself - or that you are a failure or have let yourself or your family down: 0 - not at all  Trouble concentrating on things, such as reading the newspaper or watching television: 0 - not at all  Moving or speaking so slowly that other people could have noticed  Or the opposite - being so fidgety or restless that you have been moving around a lot more than usual: 0 - not at all  Thoughts that you would be better off dead, or of hurting yourself in some way: 0 - not at all  PHQ-2 Score: 0  PHQ-2 Interpretation: Negative depression screen  PHQ-9 Score: 1   PHQ-9 Interpretation: No or Minimal depression        KIMMY-7 Flowsheet Screening    Flowsheet Row Most Recent Value   Over the last 2 weeks, how often have you been bothered by any of the following problems?     Feeling nervous, anxious, or on edge 0   Not being able to stop or control worrying 0   Worrying too much about different things 0   Trouble relaxing 0   Being so restless that it is hard to sit still 0 Becoming easily annoyed or irritable 0   Feeling afraid as if something awful might happen 0   KIMMY-7 Total Score 0          Osteoporosis - Management per Endo Dr Rudi Wall in [facility] yearly   Next appt 10/22   On Reclast - last infusion 10/21, next infusion due 10/22   Last Dexa 2020         Right Foot Metatarslagia - Management per [facility] Dr Damion Terry   Next appt PRN for CSI great toe       Dysphagia - Management per ENT Dr Yael Johnson  Next appt 11/22  Has dry mouth  Barium swallow showed some delayed swallowing  Advised GI consult for possible esophageal dilation, but patient declined        H/O Colon Polyps - Last colonoscopy 12/10/2021  Next due 12/10/26             From previous note:     Reviewed:  Labs 7/15/22, ENT 5/16/22     No Gyn                The following portions of the patient's history were reviewed and updated as appropriate: allergies, current medications, past family history, past medical history, past social history, past surgical history and problem list       Review of Systems   Constitutional: Negative for appetite change, chills, diaphoresis, fatigue and fever  Respiratory: Negative for chest tightness and shortness of breath  Cardiovascular: Negative for chest pain  Gastrointestinal: Negative for abdominal pain, blood in stool, diarrhea, nausea and vomiting  Genitourinary: Negative for dysuria          Current Outpatient Medications   Medication Sig Dispense Refill    ascorbic acid (VITAMIN C) 1000 MG tablet Take 1,000 mg by mouth daily      Biotin 300 MCG TABS Take 1 tablet by mouth in the morning        Calcium Carb-Cholecalciferol 600-800 MG-UNIT TABS Take 1 tablet by mouth 2 (two) times a day        coenzyme Q-10 100 MG capsule Take 100 mg by mouth daily      fluocinonide (LIDEX) 0 05 % cream Apply 1 application topically 2 (two) times a day as needed for rash Rx per Aicha Deleon levothyroxine (Synthroid) 50 mcg tablet 1 pill by mouth once daily on an empty stomach, 30 minutes prior to eating food or drink  30 tablet 5    Multiple Vitamin (Tab-A-Selene) TABS Take 1 tablet by mouth daily      Omega-3 Fatty Acids (fish oil) 1,000 mg Take 2,000 mg by mouth daily      traZODone (DESYREL) 50 mg tablet Take 0 5-1 tablets (25-50 mg total) by mouth daily at bedtime as needed for sleep 90 tablet 2    Turmeric 500 MG CAPS Take 1 capsule by mouth in the morning        Zoledronic Acid (RECLAST IV) 1 dose IV yearly   Zoster Vac Recomb Adjuvanted (Shingrix) 50 MCG/0 5ML SUSR 0 5mL IM for one dose, followed by 0 5mL IM 2-6 months after first dose (Patient not taking: Reported on 7/19/2022) 1 each 1     No current facility-administered medications for this visit  Objective:  /68   Pulse 84   Temp 97 8 °F (36 6 °C)   Resp 18   Ht 5' 0 8" (1 544 m)   Wt 64 7 kg (142 lb 9 6 oz)   SpO2 98%   BMI 27 12 kg/m²    Wt Readings from Last 3 Encounters:   07/19/22 64 7 kg (142 lb 9 6 oz)   05/16/22 66 1 kg (145 lb 11 6 oz)   04/15/22 66 1 kg (145 lb 11 6 oz)      BP Readings from Last 3 Encounters:   07/19/22 110/68   03/17/22 118/66   01/27/22 110/64          Physical Exam  Vitals and nursing note reviewed  Constitutional:       Appearance: Normal appearance  She is well-developed  HENT:      Head: Normocephalic and atraumatic  Mouth/Throat:      Mouth: Mucous membranes are dry  Pharynx: Oropharynx is clear  No oropharyngeal exudate or posterior oropharyngeal erythema  Eyes:      Conjunctiva/sclera: Conjunctivae normal    Neck:      Thyroid: No thyromegaly  Vascular: No carotid bruit  Cardiovascular:      Rate and Rhythm: Normal rate and regular rhythm  Pulses: Normal pulses  Heart sounds: Normal heart sounds  Pulmonary:      Effort: Pulmonary effort is normal       Breath sounds: Normal breath sounds  Abdominal:      General: Bowel sounds are normal  There is no distension  Palpations: Abdomen is soft   There is no mass  Tenderness: There is no abdominal tenderness  There is no guarding or rebound  Musculoskeletal:         General: No swelling or tenderness  Cervical back: Neck supple  Right lower leg: No edema  Left lower leg: No edema  Skin:     Comments: Left extensor wrist c approximately 3mm x 2mm tan macule (solar lentigo)   Neurological:      General: No focal deficit present  Mental Status: She is alert and oriented to person, place, and time  Psychiatric:         Mood and Affect: Mood normal          Behavior: Behavior normal          Thought Content: Thought content normal          Judgment: Judgment normal          Lab Results:      Lab Results   Component Value Date    WBC 7 89 03/10/2022    HGB 14 1 03/10/2022    HCT 41 5 03/10/2022     03/10/2022    TRIG 116 03/10/2022    HDL 83 03/10/2022    LDLDIRECT 143 (H) 07/15/2022    ALT 12 07/15/2022    AST 18 07/15/2022    K 3 8 07/15/2022     07/15/2022    CREATININE 0 81 07/15/2022    BUN 19 07/15/2022    CO2 27 07/15/2022    GLUF 92 07/15/2022    HGBA1C 5 9 (H) 07/15/2022     No results found for: URICACID  Invalid input(s): BASENAME Vitamin D    No results found  POCT Labs      BMI Counseling: Body mass index is 27 12 kg/m²  The BMI is above normal  Nutrition recommendations include encouraging healthy choices of fruits and vegetables  Exercise recommendations include exercising 3-5 times per week  No pharmacotherapy was ordered  Rationale for BMI follow-up plan is due to patient being overweight or obese  Depression Screening and Follow-up Plan: Patient was screened for depression during today's encounter  They screened negative with a PHQ-2 score of 0  BMI Counseling: Body mass index is 27 12 kg/m²  The BMI is above normal  Nutrition recommendations include 3-5 servings of fruits/vegetables daily  Exercise recommendations include exercising 3-5 times per week

## 2022-07-19 NOTE — ASSESSMENT & PLAN NOTE
Stable s statin  Recommend lifestyle modifications      The 10-year ASCVD risk score (Kelsey Delgadillo et al , 2013) is: 7%    Values used to calculate the score:      Age: 80 years      Sex: Female      Is Non- : No      Diabetic: No      Tobacco smoker: No      Systolic Blood Pressure: 431 mmHg      Is BP treated: No      HDL Cholesterol: 83 mg/dL      Total Cholesterol: 248 mg/dL

## 2022-07-19 NOTE — PATIENT INSTRUCTIONS
Weight Management   AMBULATORY CARE:   Why it is important to manage your weight:  Being overweight increases your risk of health conditions such as heart disease, high blood pressure, type 2 diabetes, and certain types of cancer  It can also increase your risk for osteoarthritis, sleep apnea, and other respiratory problems  Aim for a slow, steady weight loss  Even a small amount of weight loss can lower your risk of health problems  Risks of being overweight:  Extra weight can cause many health problems, including the following:  · Diabetes (high blood sugar level)    · High blood pressure or high cholesterol    · Heart disease    · Stroke    · Gallbladder or liver disease    · Cancer of the colon, breast, prostate, liver, or kidney    · Sleep apnea    · Arthritis or gout    Screening  is done to check for health conditions before you have signs or symptoms  If you are 28to 79years old, your blood sugar level may be checked every 3 years for signs of prediabetes or diabetes  Your healthcare provider will check your blood pressure at each visit  High blood pressure can lead to a stroke or other problems  Your provider may check for signs of heart disease, cancer, or other health problems  How to lose weight safely:  A safe and healthy way to lose weight is to eat fewer calories and get regular exercise  · You can lose up about 1 pound a week by decreasing the number of calories you eat by 500 calories each day  You can decrease calories by eating smaller portion sizes or by cutting out high-calorie foods  Read labels to find out how many calories are in the foods you eat  · You can also burn calories with exercise such as walking, swimming, or biking  You will be more likely to keep weight off if you make these changes part of your lifestyle  Exercise at least 30 minutes per day on most days of the week   You can also fit in more physical activity by taking the stairs instead of the elevator or parking farther away from stores  Ask your healthcare provider about the best exercise plan for you  Healthy meal plan for weight management:  A healthy meal plan includes a variety of foods, contains fewer calories, and helps you stay healthy  A healthy meal plan includes the following:     · Eat whole-grain foods more often  A healthy meal plan should contain fiber  Fiber is the part of grains, fruits, and vegetables that is not broken down by your body  Whole-grain foods are healthy and provide extra fiber in your diet  Some examples of whole-grain foods are whole-wheat breads and pastas, oatmeal, brown rice, and bulgur  · Eat a variety of vegetables every day  Include dark, leafy greens such as spinach, kale, luciano greens, and mustard greens  Eat yellow and orange vegetables such as carrots, sweet potatoes, and winter squash  · Eat a variety of fruits every day  Choose fresh or canned fruit (canned in its own juice or light syrup) instead of juice  Fruit juice has very little or no fiber  · Eat low-fat dairy foods  Drink fat-free (skim) milk or 1% milk  Eat fat-free yogurt and low-fat cottage cheese  Try low-fat cheeses such as mozzarella and other reduced-fat cheeses  · Choose meat and other protein foods that are low in fat  Choose beans or other legumes such as split peas or lentils  Choose fish, skinless poultry (chicken or turkey), or lean cuts of red meat (beef or pork)  Before you cook meat or poultry, cut off any visible fat  · Use less fat and oil  Try baking foods instead of frying them  Add less fat, such as margarine, sour cream, regular salad dressing and mayonnaise to foods  Eat fewer high-fat foods  Some examples of high-fat foods include french fries, doughnuts, ice cream, and cakes  · Eat fewer sweets  Limit foods and drinks that are high in sugar  This includes candy, cookies, regular soda, and sweetened drinks  Ways to decrease calories:   · Eat smaller portions  ? Use a small plate with smaller servings  ? Do not eat second helpings  ? When you eat at a restaurant, ask for a box and place half of your meal in the box before you eat  ? Share an entrée with someone else  · Replace high-calorie snacks with healthy, low-calorie snacks  ? Choose fresh fruit, vegetables, fat-free rice cakes, or air-popped popcorn instead of potato chips, nuts, or chocolate  ? Choose water or calorie-free drinks instead of soda or sweetened drinks  · Do not shop for groceries when you are hungry  You may be more likely to make unhealthy food choices  Take a grocery list of healthy foods and shop after you have eaten  · Eat regular meals  Do not skip meals  Skipping meals can lead to overeating later in the day  This can make it harder for you to lose weight  Eat a healthy snack in place of a meal if you do not have time to eat a regular meal  Talk with a dietitian to help you create a meal plan and schedule that is right for you  Other things to consider as you try to lose weight:   · Be aware of situations that may give you the urge to overeat, such as eating while watching television  Find ways to avoid these situations  For example, read a book, go for a walk, or do crafts  · Meet with a weight loss support group or friends who are also trying to lose weight  This may help you stay motivated to continue working on your weight loss goals  © Copyright [address] 2022 Information is for End User's use only and may not be sold, redistributed or otherwise used for commercial purposes  All illustrations and images included in CareNotes® are the copyrighted property of [employer]  or [employer]   The above information is an  only  It is not intended as medical advice for individual conditions or treatments  Talk to your doctor, nurse or pharmacist before following any medical regimen to see if it is safe and effective for you

## 2022-07-20 LAB
ENA SS-A AB SER-ACNC: <0.2 AI (ref 0–0.9)
ENA SS-B AB SER-ACNC: <0.2 AI (ref 0–0.9)
RHEUMATOID FACT SER QL LA: NEGATIVE
RYE IGE QN: NEGATIVE

## 2022-07-20 NOTE — RESULT ENCOUNTER NOTE
Negative anti nuclear antibody lab-rheumatologic lab      Message sent to patient via [facility] patient portal

## 2022-07-20 NOTE — RESULT ENCOUNTER NOTE
Negative rheumatoid factor - rheumatologic lab      Message sent to patient via [employer] patient portal

## 2022-07-20 NOTE — RESULT ENCOUNTER NOTE
Negative Sjogren's antibodies for Sjogren's disease      Message sent to patient via [facility] patient portal

## 2022-07-22 LAB — CCP AB SER IA-ACNC: 2.5

## 2022-07-22 NOTE — RESULT ENCOUNTER NOTE
Negative CCP rheumatologic marker  Continue plan of care        Message sent to patient via [facility] patient portal

## 2022-08-25 ENCOUNTER — PATIENT MESSAGE (OUTPATIENT)
Dept: FAMILY MEDICINE CLINIC | Facility: CLINIC | Age: 72
End: 2022-08-25

## 2022-08-25 ENCOUNTER — HOSPITAL ENCOUNTER (OUTPATIENT)
Dept: MAMMOGRAPHY | Facility: HOSPITAL | Age: 72
Discharge: HOME | End: 2022-08-25
Attending: FAMILY MEDICINE
Payer: MEDICARE

## 2022-08-25 VITALS — WEIGHT: 142.64 LBS | HEIGHT: 61 IN | BODY MASS INDEX: 26.93 KG/M2

## 2022-08-25 DIAGNOSIS — E03.8 OTHER SPECIFIED HYPOTHYROIDISM: ICD-10-CM

## 2022-08-25 DIAGNOSIS — Z12.31 ENCOUNTER FOR SCREENING MAMMOGRAM FOR BREAST CANCER: ICD-10-CM

## 2022-08-25 DIAGNOSIS — Z85.3 HISTORY OF LEFT BREAST CANCER: ICD-10-CM

## 2022-08-25 PROCEDURE — 77063 BREAST TOMOSYNTHESIS BI: CPT

## 2022-08-25 PROCEDURE — 77067 SCR MAMMO BI INCL CAD: CPT

## 2022-08-25 RX ORDER — LEVOTHYROXINE SODIUM 0.05 MG/1
TABLET ORAL
Qty: 90 TABLET | Refills: 2 | OUTPATIENT
Start: 2022-08-25

## 2022-08-26 RX ORDER — LEVOTHYROXINE SODIUM 0.05 MG/1
TABLET ORAL
Qty: 90 TABLET | Refills: 0 | Status: SHIPPED | OUTPATIENT
Start: 2022-08-26

## 2022-08-26 NOTE — PATIENT COMMUNICATION
Medication refill requested: levothyroxine  Last refilled: 7/5/22 #30x5  Labs: Yes, describe: due 11/2022  Ordering Provider: Isaiah Brandt      Pharmacy (select pharmacy send RX to):   [facility]pharmacy #[id number]- [address], [place] [address]  Phone: [phone] Fax: [phone]          Requesting 90 day supply as [place] is [place] of [place]

## 2022-08-26 NOTE — TELEPHONE ENCOUNTER
From: Brandee Moreno RN  To: Easton Cox  Sent: 8/25/2022 9:42 AM EDT  Subject: levothyroxine    Hi [place],     We received a refill request from your   [id number] - [address] [place] [address] [address]  Phone: [phone] Fax: [phone]   pharmacy regarding your levothyroxine   Currently, we do not accept refill requests from the pharmacy as we've had issues in the past with the request being incorrect on the medication and dosing  Please let our office know if you are in need of a refill at this time  Your partners in health,    Vira Koenig, Estrellita Davis, Rimma Garcia, and Denise Atkinson

## 2022-08-29 NOTE — RESULT ENCOUNTER NOTE
Normal mammogram   Advised monthly self-breast exams  Repeat in 1 year      Message sent to patient via [facility] patient portal

## 2024-04-02 ENCOUNTER — TELEPHONE (OUTPATIENT)
Age: 74
End: 2024-04-02

## 2024-04-02 NOTE — TELEPHONE ENCOUNTER
Patient calling in to let the office know that she will not be using our services any longer. She moved out of the area
